# Patient Record
Sex: MALE | Race: WHITE | NOT HISPANIC OR LATINO | Employment: FULL TIME | URBAN - METROPOLITAN AREA
[De-identification: names, ages, dates, MRNs, and addresses within clinical notes are randomized per-mention and may not be internally consistent; named-entity substitution may affect disease eponyms.]

---

## 2017-06-09 ENCOUNTER — OFFICE VISIT (OUTPATIENT)
Dept: PEDIATRICS | Facility: CLINIC | Age: 27
End: 2017-06-09
Payer: COMMERCIAL

## 2017-06-09 VITALS
OXYGEN SATURATION: 97 % | WEIGHT: 206.9 LBS | TEMPERATURE: 97.5 F | BODY MASS INDEX: 25.73 KG/M2 | HEIGHT: 75 IN | HEART RATE: 99 BPM | SYSTOLIC BLOOD PRESSURE: 130 MMHG | DIASTOLIC BLOOD PRESSURE: 86 MMHG

## 2017-06-09 DIAGNOSIS — I10 ESSENTIAL HYPERTENSION WITH GOAL BLOOD PRESSURE LESS THAN 140/90: ICD-10-CM

## 2017-06-09 DIAGNOSIS — F40.10 SOCIAL PHOBIA: ICD-10-CM

## 2017-06-09 DIAGNOSIS — Z00.00 ROUTINE GENERAL MEDICAL EXAMINATION AT A HEALTH CARE FACILITY: Primary | ICD-10-CM

## 2017-06-09 LAB
ANION GAP SERPL CALCULATED.3IONS-SCNC: 10 MMOL/L (ref 3–14)
BUN SERPL-MCNC: 11 MG/DL (ref 7–30)
CALCIUM SERPL-MCNC: 9.3 MG/DL (ref 8.5–10.1)
CHLORIDE SERPL-SCNC: 103 MMOL/L (ref 94–109)
CHOLEST SERPL-MCNC: 188 MG/DL
CO2 SERPL-SCNC: 28 MMOL/L (ref 20–32)
CREAT SERPL-MCNC: 0.9 MG/DL (ref 0.66–1.25)
GFR SERPL CREATININE-BSD FRML MDRD: NORMAL ML/MIN/1.7M2
GLUCOSE SERPL-MCNC: 98 MG/DL (ref 70–99)
HDLC SERPL-MCNC: 65 MG/DL
LDLC SERPL CALC-MCNC: 113 MG/DL
NONHDLC SERPL-MCNC: 123 MG/DL
POTASSIUM SERPL-SCNC: 3.5 MMOL/L (ref 3.4–5.3)
SODIUM SERPL-SCNC: 141 MMOL/L (ref 133–144)
TRIGL SERPL-MCNC: 50 MG/DL

## 2017-06-09 PROCEDURE — 36415 COLL VENOUS BLD VENIPUNCTURE: CPT | Performed by: INTERNAL MEDICINE

## 2017-06-09 PROCEDURE — 80061 LIPID PANEL: CPT | Performed by: INTERNAL MEDICINE

## 2017-06-09 PROCEDURE — 99395 PREV VISIT EST AGE 18-39: CPT | Performed by: INTERNAL MEDICINE

## 2017-06-09 PROCEDURE — 80048 BASIC METABOLIC PNL TOTAL CA: CPT | Performed by: INTERNAL MEDICINE

## 2017-06-09 RX ORDER — HYDROCHLOROTHIAZIDE 25 MG/1
25 TABLET ORAL EVERY MORNING
Qty: 90 TABLET | Refills: 3 | Status: SHIPPED | OUTPATIENT
Start: 2017-06-09 | End: 2018-03-16

## 2017-06-09 RX ORDER — PAROXETINE 20 MG/1
20 TABLET, FILM COATED ORAL EVERY MORNING
Qty: 90 TABLET | Refills: 3 | Status: SHIPPED | OUTPATIENT
Start: 2017-06-09 | End: 2018-07-18 | Stop reason: SINTOL

## 2017-06-09 NOTE — PROGRESS NOTES
SUBJECTIVE:     CC: Akira Fritz is an 27 year old male who presents for preventative health visit.     Physical   Annual:     Getting at least 3 servings of Calcium per day::  Yes    Bi-annual eye exam::  Yes    Dental care twice a year::  Yes    Sleep apnea or symptoms of sleep apnea::  None    Frequency of exercise::  2-3 days/week    Duration of exercise::  30-45 minutes    Taking medications regularly::  Yes    Medication side effects::  Other    HTN. Taking HCTZ. BP is controlled. No cardiac sx such as CP, palpitations, PND, orthopnea, LEMON or peripheral edema.    Social phobia. Helped by paroxetine.     Today's PHQ-2 Score:   PHQ-2 ( 1999 Pfizer) 6/8/2017   Q1: Little interest or pleasure in doing things Not at all   Q2: Feeling down, depressed or hopeless Not at all   PHQ-2 Score 0       Abuse: Current or Past(Physical, Sexual or Emotional)- No  Do you feel safe in your environment - Yes    Social History   Substance Use Topics     Smoking status: Never Smoker     Smokeless tobacco: Never Used      Comment: non smoking home     Alcohol use 0.0 oz/week     0 Standard drinks or equivalent per week      Comment: social     The patient does not drink >3 drinks per day nor >7 drinks per week.    Last PSA: No results found for: PSA    Recent Labs   Lab Test  01/20/15   0734  01/06/12   1324   CHOL  201*  236*   HDL  87  62   LDL  91   --    TRIG  115   --    CHOLHDLRATIO  2.3   --        Reviewed orders with patient. Reviewed health maintenance and updated orders accordingly - Yes    Reviewed and updated as needed this visit by clinical staff  Tobacco  Allergies  Meds  Med Hx  Surg Hx  Fam Hx  Soc Hx        Reviewed and updated as needed this visit by Provider  Tobacco  Allergies  Meds  Problems  Med Hx  Surg Hx  Fam Hx  Soc Hx             ROS:  C: NEGATIVE for fever, chills, change in weight  I: NEGATIVE for worrisome rashes, moles or lesions  E: NEGATIVE for vision changes or  "irritation  ENT: NEGATIVE for ear, mouth and throat problems  R: NEGATIVE for significant cough or SOB  CV: NEGATIVE for chest pain, palpitations or peripheral edema  GI: NEGATIVE for nausea, abdominal pain, heartburn, or change in bowel habits   male: negative for dysuria, hematuria, decreased urinary stream, erectile dysfunction, urethral discharge  M: NEGATIVE for significant arthralgias or myalgia  N: NEGATIVE for weakness, dizziness or paresthesias  P: NEGATIVE for changes in mood or affect    Problem list, Medication list, Allergies, and Medical/Social/Surgical histories reviewed in [facility] and updated as appropriate.  Labs reviewed in EPIC  OBJECTIVE:     /86 (Cuff Size: Adult Regular)  Pulse 99  Temp 97.5  F (36.4  C) (Oral)  Ht 6' 3\" (1.905 m)  Wt 206 lb 14.4 oz (93.8 kg)  SpO2 97%  BMI 25.86 kg/m2  EXAM:  GENERAL: healthy, alert and no distress  EYES: Eyes grossly normal to inspection, PERRL and conjunctivae and sclerae normal  HENT: ear canals and TM's normal, nose and mouth without ulcers or lesions  NECK: no adenopathy, no asymmetry, masses, or scars and thyroid normal to palpation  RESP: lungs clear to auscultation - no rales, rhonchi or wheezes  CV: regular rate and rhythm, normal S1 S2, no S3 or S4, no murmur, click or rub, no peripheral edema and peripheral pulses strong  ABDOMEN: soft, nontender, no hepatosplenomegaly, no masses and bowel sounds normal  : normal male genitalia without lesions or urethral discharge, no hernia  MS: no gross musculoskeletal defects noted, no edema  SKIN: no suspicious lesions or rashes  NEURO: Normal strength and tone, mentation intact and speech normal  PSYCH: mentation appears normal, affect normal/bright    ASSESSMENT/PLAN:         ICD-10-CM    1. Routine general medical examination at a health care facility Z00.00 Basic metabolic panel     Lipid Profile (Chol, Trig, HDL, LDL calc)   2. Essential hypertension with goal blood pressure less than 140/90 " "I10 hydrochlorothiazide (HYDRODIURIL) 25 MG tablet     Basic metabolic panel   3. Social phobia F40.10 PARoxetine (PAXIL) 20 MG tablet       COUNSELING:   Reviewed preventive health counseling, as reflected in patient instructions         reports that he has never smoked. He has never used smokeless tobacco.    Estimated body mass index is 25.86 kg/(m^2) as calculated from the following:    Height as of this encounter: 6' 3\" (1.905 m).    Weight as of this encounter: 206 lb 14.4 oz (93.8 kg).   Weight management plan: Discussed healthy diet and exercise guidelines and patient will follow up in 12 months in clinic to re-evaluate.      Gildardo Engel MD  [facility]  "

## 2017-06-09 NOTE — MR AVS SNAPSHOT
After Visit Summary   6/9/2017    Akira Fritz    MRN: [id number]           Patient Information     Date Of Birth          1990        Visit Information        Provider Department      6/9/2017 9:20 AM Gildardo Engel MD [facility]        Today's Diagnoses     Routine general medical examination at a health care facility    -  1    Essential hypertension with goal blood pressure less than 140/90        Social phobia          Care Instructions      Preventive Health Recommendations    Yearly exam:             See your health care provider every year in order to  o   Review health changes.   o   Discuss preventive care.    o   Review your medicines.    Shots: Get a flu shot each year. Get a tetanus shot every 10 years.     Nutrition:    Eat at least 5 servings of fruits and vegetables daily.     Eat whole-grain bread, whole-wheat pasta and brown rice instead of white grains and rice.     Get adequate Calcium and Vitamin D.     Lifestyle    Exercise for at least 150 minutes a week (30 minutes a day, 5 days a week). This will help you control your weight and prevent disease.     Limit alcohol to one drink per day.     No smoking.     Wear sunscreen to prevent skin cancer.      See your dentist every six months for an exam and cleaning.             Follow-ups after your visit        Who to contact     If you have questions or need follow up information about today's clinic visit or your schedule please contact [facility] directly at [phone].  Normal or non-critical lab and imaging results will be communicated to you by MyChart, letter or phone within 4 business days after the clinic has received the results. If you do not hear from us within 7 days, please contact the clinic through MyChart or phone. If you have a critical or abnormal lab result, we will notify you by phone as soon as possible.  Submit refill requests through [employer] or call your pharmacy and they  "will forward the refill request to us. Please allow 3 business days for your refill to be completed.          Additional Information About Your Visit        [employer] Information     [employer] gives you secure access to your electronic health record. If you see a primary care provider, you can also send messages to your care team and make appointments. If you have questions, please call your primary care clinic.  If you do not have a primary care provider, please call [phone] and they will assist you.        Care EveryWhere ID     This is your Care EveryWhere ID. This could be used by other organizations to access your [place] medical records  [id number]        Your Vitals Were     Pulse Temperature Height Pulse Oximetry BMI (Body Mass Index)       99 97.5  F (36.4  C) (Oral) 6' 3\" (1.905 m) 97% 25.86 kg/m2        Blood Pressure from Last 3 Encounters:   06/09/17 130/86   06/06/16 120/80   01/20/15 (!) 123/92    Weight from Last 3 Encounters:   06/09/17 206 lb 14.4 oz (93.8 kg)   06/06/16 197 lb (89.4 kg)   01/20/15 178 lb (80.7 kg)              We Performed the Following     Basic metabolic panel     Lipid Profile (Chol, Trig, HDL, LDL calc)          Where to get your medicines      These medications were sent to [facility] [address] [address]     Phone:  [phone]     hydrochlorothiazide 25 MG tablet    PARoxetine 20 MG tablet          Primary Care Provider Office Phone # Fax #    Gildardo Engel [phone]       [address] [place] [address]        Thank you!     Thank you for choosing [facility]  for your care. Our goal is always to provide you with excellent care. Hearing back from our patients is one way we can continue to improve our services. Please take a few minutes to complete the written survey that you may receive in the mail after your visit with us. " Thank you!             Your Updated Medication List - Protect others around you: Learn how to safely use, store and throw away your medicines at www.disposemymeds.org.          This list is accurate as of: 6/9/17  9:44 AM.  Always use your most recent med list.                   Brand Name Dispense Instructions for use    hydrochlorothiazide 25 MG tablet    HYDRODIURIL    90 tablet    Take 1 tablet (25 mg) by mouth every morning       PARoxetine 20 MG tablet    PAXIL    90 tablet    Take 1 tablet (20 mg) by mouth every morning

## 2017-06-09 NOTE — PATIENT INSTRUCTIONS
Preventive Health Recommendations    Yearly exam:             See your health care provider every year in order to  o   Review health changes.   o   Discuss preventive care.    o   Review your medicines.    Shots: Get a flu shot each year. Get a tetanus shot every 10 years.     Nutrition:    Eat at least 5 servings of fruits and vegetables daily.     Eat whole-grain bread, whole-wheat pasta and brown rice instead of white grains and rice.     Get adequate Calcium and Vitamin D.     Lifestyle    Exercise for at least 150 minutes a week (30 minutes a day, 5 days a week). This will help you control your weight and prevent disease.     Limit alcohol to one drink per day.     No smoking.     Wear sunscreen to prevent skin cancer.      See your dentist every six months for an exam and cleaning.

## 2017-06-09 NOTE — NURSING NOTE
"Chief Complaint   Patient presents with     Physical       Initial /86 (Cuff Size: Adult Regular)  Pulse 99  Temp 97.5  F (36.4  C) (Oral)  Ht 6' 3\" (1.905 m)  Wt 206 lb 14.4 oz (93.8 kg)  SpO2 97%  BMI 25.86 kg/m2 Estimated body mass index is 25.86 kg/(m^2) as calculated from the following:    Height as of this encounter: 6' 3\" (1.905 m).    Weight as of this encounter: 206 lb 14.4 oz (93.8 kg).  Medication Reconciliation: carlos alberto Paula   "

## 2017-09-22 ENCOUNTER — MYC MEDICAL ADVICE (OUTPATIENT)
Dept: PEDIATRICS | Facility: CLINIC | Age: 27
End: 2017-09-22

## 2017-09-22 NOTE — TELEPHONE ENCOUNTER
Advised pt to send a e-visit request to discuss about new dx of GERD. LOV was on 06/09/17.     Katherin, RN  Triage Nurse

## 2017-10-01 ENCOUNTER — MYC MEDICAL ADVICE (OUTPATIENT)
Dept: PEDIATRICS | Facility: CLINIC | Age: 27
End: 2017-10-01

## 2017-10-01 DIAGNOSIS — I10 ESSENTIAL HYPERTENSION WITH GOAL BLOOD PRESSURE LESS THAN 140/90: ICD-10-CM

## 2017-10-02 RX ORDER — HYDROCHLOROTHIAZIDE 25 MG/1
25 TABLET ORAL
Qty: 30 TABLET | Refills: 0 | Status: SHIPPED | OUTPATIENT
Start: 2017-10-02 | End: 2019-06-15

## 2017-10-02 NOTE — TELEPHONE ENCOUNTER
Sent one month supply of hctz to [facility] in [place] to last till his mail order shipment to arrive.    Katherin, RN  Triage Nurse

## 2018-03-15 ENCOUNTER — MYC MEDICAL ADVICE (OUTPATIENT)
Dept: PEDIATRICS | Facility: CLINIC | Age: 28
End: 2018-03-15

## 2018-03-15 DIAGNOSIS — I10 ESSENTIAL HYPERTENSION WITH GOAL BLOOD PRESSURE LESS THAN 140/90: ICD-10-CM

## 2018-03-16 RX ORDER — HYDROCHLOROTHIAZIDE 25 MG/1
25 TABLET ORAL EVERY MORNING
Qty: 90 TABLET | Refills: 0 | Status: SHIPPED | OUTPATIENT
Start: 2018-03-16 | End: 2018-07-18

## 2018-03-16 NOTE — TELEPHONE ENCOUNTER
Pt would like us to transfer his HCTZ to [facility]. Sent 3 months supply to requested pharmacy.     Pt will be due for px & fasting labs after 06/09/18. Advised pt to schedule an appointment.    hydrochlorothiazide (HYDRODIURIL) 25 MG tablet 90 tablet 3 6/9/2017  No   Sig: Take 1 tablet (25 mg) by mouth every morning     Katherin RN  Triage Nurse

## 2018-07-18 ENCOUNTER — OFFICE VISIT (OUTPATIENT)
Dept: PEDIATRICS | Facility: CLINIC | Age: 28
End: 2018-07-18
Payer: COMMERCIAL

## 2018-07-18 VITALS
SYSTOLIC BLOOD PRESSURE: 132 MMHG | OXYGEN SATURATION: 98 % | DIASTOLIC BLOOD PRESSURE: 84 MMHG | TEMPERATURE: 98.7 F | RESPIRATION RATE: 14 BRPM | HEIGHT: 76 IN | WEIGHT: 216 LBS | BODY MASS INDEX: 26.3 KG/M2 | HEART RATE: 112 BPM

## 2018-07-18 DIAGNOSIS — Z00.00 ROUTINE GENERAL MEDICAL EXAMINATION AT A HEALTH CARE FACILITY: Primary | ICD-10-CM

## 2018-07-18 DIAGNOSIS — F40.10 SOCIAL PHOBIA: ICD-10-CM

## 2018-07-18 DIAGNOSIS — I10 ESSENTIAL HYPERTENSION WITH GOAL BLOOD PRESSURE LESS THAN 140/90: ICD-10-CM

## 2018-07-18 LAB
ANION GAP SERPL CALCULATED.3IONS-SCNC: 5 MMOL/L (ref 3–14)
BUN SERPL-MCNC: 16 MG/DL (ref 7–30)
CALCIUM SERPL-MCNC: 8.9 MG/DL (ref 8.5–10.1)
CHLORIDE SERPL-SCNC: 102 MMOL/L (ref 94–109)
CO2 SERPL-SCNC: 32 MMOL/L (ref 20–32)
CREAT SERPL-MCNC: 0.95 MG/DL (ref 0.66–1.25)
GFR SERPL CREATININE-BSD FRML MDRD: >90 ML/MIN/1.7M2
GLUCOSE SERPL-MCNC: 100 MG/DL (ref 70–99)
POTASSIUM SERPL-SCNC: 3.6 MMOL/L (ref 3.4–5.3)
SODIUM SERPL-SCNC: 139 MMOL/L (ref 133–144)

## 2018-07-18 PROCEDURE — 99395 PREV VISIT EST AGE 18-39: CPT | Performed by: INTERNAL MEDICINE

## 2018-07-18 PROCEDURE — 36415 COLL VENOUS BLD VENIPUNCTURE: CPT | Performed by: INTERNAL MEDICINE

## 2018-07-18 PROCEDURE — 80048 BASIC METABOLIC PNL TOTAL CA: CPT | Performed by: INTERNAL MEDICINE

## 2018-07-18 RX ORDER — HYDROCHLOROTHIAZIDE 25 MG/1
25 TABLET ORAL EVERY MORNING
Qty: 90 TABLET | Refills: 3 | Status: SHIPPED | OUTPATIENT
Start: 2018-07-18 | End: 2019-06-11

## 2018-07-18 RX ORDER — ESCITALOPRAM OXALATE 10 MG/1
10 TABLET ORAL DAILY
Qty: 90 TABLET | Refills: 3 | Status: SHIPPED | OUTPATIENT
Start: 2018-07-18 | End: 2019-06-11

## 2018-07-18 RX ORDER — PAROXETINE 20 MG/1
20 TABLET, FILM COATED ORAL EVERY MORNING
Qty: 90 TABLET | Refills: 3 | Status: CANCELLED | OUTPATIENT
Start: 2018-07-18

## 2018-07-18 ASSESSMENT — ENCOUNTER SYMPTOMS
MUSCULOSKELETAL NEGATIVE: 1
NERVOUS/ANXIOUS: 1
ENDOCRINE NEGATIVE: 1
RESPIRATORY NEGATIVE: 1
GASTROINTESTINAL NEGATIVE: 1
EYES NEGATIVE: 1
CARDIOVASCULAR NEGATIVE: 1
CONSTITUTIONAL NEGATIVE: 1
NEUROLOGICAL NEGATIVE: 1

## 2018-07-18 NOTE — PROGRESS NOTES
SUBJECTIVE:   CC: Akira Fritz is an 28 year old male who presents for preventative health visit.     Physical   Annual:     Getting at least 3 servings of Calcium per day:  Yes    Bi-annual eye exam:  Yes    Dental care twice a year:  Yes    Sleep apnea or symptoms of sleep apnea:  None    Diet:  Regular (no restrictions)    Frequency of exercise:  2-3 days/week    Duration of exercise:  30-45 minutes    Taking medications regularly:  Yes    Medication side effects:  Other    Additional concerns today:  YES (Acid reflux concerns; side effect from paxil)    HTN. Does not typically check BP outside of clinic. No cardiac sx such as CP, palpitations, PND, orthopnea, LEMON or peripheral edema.     At a recent dental visit, concerns about erosions on the back teeth. Question possible FITO. No sx of burning sensation or water brash. No difficulty swallowing. Discussed options - H2 blocker if med wanted to be tried.     Taking paroxetine for anxiety. Having possible SE of decreased libido. Would like to change medication. Discussed Lexapro.     Today's PHQ-2 Score:   PHQ-2 ( 1999 Pfizer) 7/17/2018   Q1: Little interest or pleasure in doing things 0   Q2: Feeling down, depressed or hopeless 0   PHQ-2 Score 0   Q1: Little interest or pleasure in doing things Not at all   Q2: Feeling down, depressed or hopeless Not at all   PHQ-2 Score 0       Abuse: Current or Past(Physical, Sexual or Emotional)- No  Do you feel safe in your environment - Yes    Social History   Substance Use Topics     Smoking status: Never Smoker     Smokeless tobacco: Never Used      Comment: non smoking home     Alcohol use 0.0 oz/week     0 Standard drinks or equivalent per week      Comment: social     Alcohol Use 7/17/2018   If you drink alcohol do you typically have greater than 3 drinks per day OR greater than 7 drinks per week? No   No flowsheet data found.    Reviewed orders with patient. Reviewed health maintenance and updated orders  "accordingly - Yes  Labs reviewed in EPIC    Reviewed and updated as needed this visit by clinical staff  Tobacco  Allergies  Meds  Problems  Med Hx  Surg Hx  Fam Hx  Soc Hx          Reviewed and updated as needed this visit by Provider  Tobacco  Allergies  Meds  Problems  Med Hx  Surg Hx  Fam Hx  Soc Hx             Review of Systems   Constitutional: Negative.    HENT: Negative.    Eyes: Negative.    Respiratory: Negative.    Cardiovascular: Negative.    Gastrointestinal: Negative.    Endocrine: Negative.    Genitourinary: Negative.    Musculoskeletal: Negative.    Skin: Negative.    Neurological: Negative.    Psychiatric/Behavioral: The patient is nervous/anxious.        OBJECTIVE:   /84  Pulse 112  Temp 98.7  F (37.1  C) (Tympanic)  Resp 14  Ht 6' 4\" (1.93 m)  Wt 216 lb (98 kg)  SpO2 98%  BMI 26.29 kg/m2    Physical Exam  GENERAL: healthy, alert and no distress  EYES: Eyes grossly normal to inspection, PERRL and conjunctivae and sclerae normal  HENT: ear canals and TM's normal, nose and mouth without ulcers or lesions  NECK: no adenopathy, no asymmetry, masses, or scars and thyroid normal to palpation  RESP: lungs clear to auscultation - no rales, rhonchi or wheezes  CV: regular rate and rhythm, normal S1 S2, no S3 or S4, no murmur, click or rub, no peripheral edema and peripheral pulses strong  ABDOMEN: soft, nontender, no hepatosplenomegaly, no masses and bowel sounds normal  : deferred per pt  MS: no gross musculoskeletal defects noted, no edema  SKIN: no suspicious lesions or rashes  NEURO: Normal strength and tone, mentation intact and speech normal  PSYCH: mentation appears normal, affect normal/bright      ASSESSMENT/PLAN:       ICD-10-CM    1. Routine general medical examination at a health care facility Z00.00 Basic metabolic panel   2. Essential hypertension with goal blood pressure less than 140/90 I10 hydrochlorothiazide (HYDRODIURIL) 25 MG tablet     Basic metabolic panel " "  3. Social phobia F40.10 escitalopram (LEXAPRO) 10 MG tablet     If you want to try medications for possible acid reflux, I recommend:   Zantac (ranitidine) 150 mg twice per day or   Pepcid (famotidine) 20 mg twice per day    Change from Paxil to Lexapro (escitalopram) 10 mg once per day   If needed, contact me to increase the dose to 20 mg once per day    COUNSELING:   Reviewed preventive health counseling, as reflected in patient instructions    BP Readings from Last 1 Encounters:   07/18/18 132/84     Estimated body mass index is 26.29 kg/(m^2) as calculated from the following:    Height as of this encounter: 6' 4\" (1.93 m).    Weight as of this encounter: 216 lb (98 kg).      Weight management plan: Discussed healthy diet and exercise guidelines and patient will follow up in 12 months in clinic to re-evaluate.     reports that he has never smoked. He has never used smokeless tobacco.      Gildardo Engel MD  [facility]  "

## 2018-07-18 NOTE — MR AVS SNAPSHOT
After Visit Summary   7/18/2018    Akira Fritz    MRN: [id number]           Patient Information     Date Of Birth          1990        Visit Information        Provider Department      7/18/2018 7:20 AM Gildardo Engel MD [facility]        Today's Diagnoses     Routine general medical examination at a health care facility    -  1    Essential hypertension with goal blood pressure less than 140/90        Social phobia          Care Instructions    If you want to try medications for possible acid reflux, I recommend:   Zantac (ranitidine) 150 mg twice per day or   Pepcid (famotidine) 20 mg twice per day    Change from Paxil to Lexapro (escitalopram) 10 mg once per day   If needed, contact me to increase the dose to 20 mg once per day      Preventive Health Recommendations    Yearly exam:             See your health care provider every year in order to  o   Review health changes.   o   Discuss preventive care.      o   Review your medicines.  Shots: Get a flu shot each year. Get a tetanus shot every 10 years.     Nutrition:    Eat at least 5 servings of fruits and vegetables daily.     Eat whole-grain bread, whole-wheat pasta and brown rice instead of white grains and rice.     Get adequate Calcium and Vitamin D.     Lifestyle    Exercise for at least 150 minutes a week (30 minutes a day, 5 days a week). This will help you control your weight and prevent disease.     Limit alcohol to one drink per day.     No smoking.     Wear sunscreen to prevent skin cancer.     See your dentist every six months for an exam and cleaning.             Follow-ups after your visit        Who to contact     If you have questions or need follow up information about today's clinic visit or your schedule please contact [facility] directly at [phone].  Normal or non-critical lab and imaging results will be communicated to you by MyChart, letter or phone within 4 business days after the  "clinic has received the results. If you do not hear from us within 7 days, please contact the clinic through [employer] or phone. If you have a critical or abnormal lab result, we will notify you by phone as soon as possible.  Submit refill requests through [employer] or call your pharmacy and they will forward the refill request to us. Please allow 3 business days for your refill to be completed.          Additional Information About Your Visit        [employer] Information     [employer] gives you secure access to your electronic health record. If you see a primary care provider, you can also send messages to your care team and make appointments. If you have questions, please call your primary care clinic.  If you do not have a primary care provider, please call [phone] and they will assist you.        Care EveryWhere ID     This is your Care EveryWhere ID. This could be used by other organizations to access your [place] medical records  [id number]        Your Vitals Were     Pulse Temperature Respirations Height Pulse Oximetry BMI (Body Mass Index)    112 98.7  F (37.1  C) (Tympanic) 14 6' 4\" (1.93 m) 98% 26.29 kg/m2       Blood Pressure from Last 3 Encounters:   07/18/18 132/84   06/09/17 130/86   06/06/16 120/80    Weight from Last 3 Encounters:   07/18/18 216 lb (98 kg)   06/09/17 206 lb 14.4 oz (93.8 kg)   06/06/16 197 lb (89.4 kg)              We Performed the Following     Basic metabolic panel          Today's Medication Changes          These changes are accurate as of 7/18/18  7:42 AM.  If you have any questions, ask your nurse or doctor.               Start taking these medicines.        Dose/Directions    escitalopram 10 MG tablet   Commonly known as:  LEXAPRO   Used for:  Social phobia   Started by:  Gildardo Engel MD        Dose:  10 mg   Take 1 tablet (10 mg) by mouth daily   Quantity:  90 tablet   Refills:  3         Stop taking these medicines if you haven't already. Please contact your care team if " you have questions.     PARoxetine 20 MG tablet   Commonly known as:  PAXIL   Stopped by:  Gildardo Engel MD                Where to get your medicines      These medications were sent to [facility] [id number] [facility] - [place], [place] - [address]  [address], [facility] [address]     Phone:  [phone]     escitalopram 10 MG tablet    hydrochlorothiazide 25 MG tablet                Primary Care Provider Office Phone # Fax #    Gildardo Engel [phone]       [facility] [place] [address]        Equal Access to Services     [facility]: Hadii aad ku hadasho Soomaali, waaxda luqadaha, qaybta kaalmada adeegyada, waxay carmen haymatt dunaway . So [facility] [phone].    ATENCIÓN: Si habla español, tiene a crook disposición servicios gratuitos de asistencia lingüística. [facility] [phone].    We comply with applicable federal civil rights laws and [place] laws. We do not discriminate on the basis of race, color, national origin, age, disability, sex, sexual orientation, or gender identity.            Thank you!     Thank you for choosing [facility]  for your care. Our goal is always to provide you with excellent care. Hearing back from our patients is one way we can continue to improve our services. Please take a few minutes to complete the written survey that you may receive in the mail after your visit with us. Thank you!             Your Updated Medication List - Protect others around you: Learn how to safely use, store and throw away your medicines at www.disposemymeds.org.          This list is accurate as of 7/18/18  7:42 AM.  Always use your most recent med list.                   Brand Name Dispense Instructions for use Diagnosis    escitalopram 10 MG tablet    LEXAPRO    90 tablet    Take 1 tablet (10 mg) by mouth daily    Social phobia       hydrochlorothiazide 25 MG tablet    HYDRODIURIL    90 tablet    Take 1 tablet (25 mg) by mouth  every morning    Essential hypertension with goal blood pressure less than 140/90

## 2018-07-18 NOTE — PATIENT INSTRUCTIONS
If you want to try medications for possible acid reflux, I recommend:   Zantac (ranitidine) 150 mg twice per day or   Pepcid (famotidine) 20 mg twice per day    Change from Paxil to Lexapro (escitalopram) 10 mg once per day   If needed, contact me to increase the dose to 20 mg once per day      Preventive Health Recommendations    Yearly exam:             See your health care provider every year in order to  o   Review health changes.   o   Discuss preventive care.      o   Review your medicines.  Shots: Get a flu shot each year. Get a tetanus shot every 10 years.     Nutrition:    Eat at least 5 servings of fruits and vegetables daily.     Eat whole-grain bread, whole-wheat pasta and brown rice instead of white grains and rice.     Get adequate Calcium and Vitamin D.     Lifestyle    Exercise for at least 150 minutes a week (30 minutes a day, 5 days a week). This will help you control your weight and prevent disease.     Limit alcohol to one drink per day.     No smoking.     Wear sunscreen to prevent skin cancer.     See your dentist every six months for an exam and cleaning.

## 2019-06-11 ENCOUNTER — MYC REFILL (OUTPATIENT)
Dept: PEDIATRICS | Facility: CLINIC | Age: 29
End: 2019-06-11

## 2019-06-11 DIAGNOSIS — I10 ESSENTIAL HYPERTENSION WITH GOAL BLOOD PRESSURE LESS THAN 140/90: ICD-10-CM

## 2019-06-11 DIAGNOSIS — F40.10 SOCIAL PHOBIA: ICD-10-CM

## 2019-06-12 ENCOUNTER — MYC MEDICAL ADVICE (OUTPATIENT)
Dept: PEDIATRICS | Facility: CLINIC | Age: 29
End: 2019-06-12

## 2019-06-12 RX ORDER — HYDROCHLOROTHIAZIDE 25 MG/1
25 TABLET ORAL EVERY MORNING
Qty: 30 TABLET | Refills: 1 | Status: SHIPPED | OUTPATIENT
Start: 2019-06-12 | End: 2019-08-14

## 2019-06-12 RX ORDER — ESCITALOPRAM OXALATE 10 MG/1
10 TABLET ORAL DAILY
Qty: 30 TABLET | Refills: 1 | Status: SHIPPED | OUTPATIENT
Start: 2019-06-12 | End: 2019-08-14

## 2019-06-12 NOTE — TELEPHONE ENCOUNTER
"Last Written Prescription Date:  7/18/2018  Last Fill Quantity: 90,  # refills: 3   Last office visit: 7/18/2018 with prescribing provider:  Yes   Future Office Visit:   Next 5 appointments (look out 90 days)    Aug 09, 2019 10:25 AM CDT  Adult physical with Gildardo Engel MD, EA EXAM ROOM 08  [facility] [address] [place] [address]  [address] [place] [id number] [phone]             Requested Prescriptions   Pending Prescriptions Disp Refills     hydrochlorothiazide (HYDRODIURIL) 25 MG tablet 90 tablet 3     Sig: Take 1 tablet (25 mg) by mouth every morning       Diuretics (Including Combos) Protocol Passed - 6/11/2019  3:26 PM        Passed - Blood pressure under 140/90 in past 12 months     BP Readings from Last 3 Encounters:   07/18/18 132/84   06/09/17 130/86   06/06/16 120/80                 Passed - Recent (12 mo) or future (30 days) visit within the authorizing provider's specialty     Patient had office visit in the last 12 months or has a visit in the next 30 days with authorizing provider or within the authorizing provider's specialty.  See \"Patient Info\" tab in inbasket, or \"Choose Columns\" in Meds & Orders section of the refill encounter.              Passed - Medication is active on med list        Passed - Patient is age 18 or older        Passed - Normal serum creatinine on file in past 12 months     Recent Labs   Lab Test 07/18/18  0742   CR 0.95              Passed - Normal serum potassium on file in past 12 months     Recent Labs   Lab Test 07/18/18  0742   POTASSIUM 3.6                    Passed - Normal serum sodium on file in past 12 months     Recent Labs   Lab Test 07/18/18  0742                 escitalopram (LEXAPRO) 10 MG tablet 90 tablet 3     Sig: Take 1 tablet (10 mg) by mouth daily       SSRIs Protocol Passed - 6/11/2019  3:26 PM        Passed - Recent (12 mo) or future (30 days) visit within the authorizing provider's specialty     " "Patient had office visit in the last 12 months or has a visit in the next 30 days with authorizing provider or within the authorizing provider's specialty.  See \"Patient Info\" tab in inbasket, or \"Choose Columns\" in Meds & Orders section of the refill encounter.              Passed - Medication is active on med list        Passed - Patient is age 18 or older            Prescription approved per [facility] Refill Protocol.  30 day supply given with 1 refill.  Appointment scheduled.  Patient notified by phone.      KANWAL Palacios, RN, N  [facility]) [phone]      "

## 2019-06-28 DIAGNOSIS — I10 ESSENTIAL HYPERTENSION WITH GOAL BLOOD PRESSURE LESS THAN 140/90: ICD-10-CM

## 2019-06-28 DIAGNOSIS — F40.10 SOCIAL PHOBIA: ICD-10-CM

## 2019-06-28 RX ORDER — ESCITALOPRAM OXALATE 10 MG/1
TABLET ORAL
Qty: 90 TABLET | Refills: 0 | Status: SHIPPED | OUTPATIENT
Start: 2019-06-28 | End: 2020-01-08

## 2019-06-28 RX ORDER — HYDROCHLOROTHIAZIDE 25 MG/1
25 TABLET ORAL EVERY MORNING
Qty: 90 TABLET | Refills: 0 | Status: SHIPPED | OUTPATIENT
Start: 2019-06-28 | End: 2019-11-06

## 2019-06-28 NOTE — TELEPHONE ENCOUNTER
Not ciara for a refill. Patient has 1 refill left.     escitalopram (LEXAPRO) 10 MG tablet was filled on 6/12/2019, qty 30 with 1 refills.     hydrochlorothiazide (HYDRODIURIL) 25 MG tablet was filled on 6/12/2019, qty 30 with 1 refills.

## 2019-08-14 ENCOUNTER — OFFICE VISIT (OUTPATIENT)
Dept: PEDIATRICS | Facility: CLINIC | Age: 29
End: 2019-08-14
Payer: COMMERCIAL

## 2019-08-14 VITALS
HEIGHT: 76 IN | HEART RATE: 132 BPM | RESPIRATION RATE: 16 BRPM | OXYGEN SATURATION: 98 % | WEIGHT: 216 LBS | SYSTOLIC BLOOD PRESSURE: 146 MMHG | DIASTOLIC BLOOD PRESSURE: 104 MMHG | BODY MASS INDEX: 26.3 KG/M2 | TEMPERATURE: 98 F

## 2019-08-14 DIAGNOSIS — I10 ESSENTIAL HYPERTENSION: ICD-10-CM

## 2019-08-14 DIAGNOSIS — Z00.00 ROUTINE GENERAL MEDICAL EXAMINATION AT A HEALTH CARE FACILITY: Primary | ICD-10-CM

## 2019-08-14 DIAGNOSIS — F40.10 SOCIAL PHOBIA: ICD-10-CM

## 2019-08-14 DIAGNOSIS — M65.4 RADIAL STYLOID TENOSYNOVITIS (DE QUERVAIN): ICD-10-CM

## 2019-08-14 PROCEDURE — 80061 LIPID PANEL: CPT | Performed by: INTERNAL MEDICINE

## 2019-08-14 PROCEDURE — 80048 BASIC METABOLIC PNL TOTAL CA: CPT | Performed by: INTERNAL MEDICINE

## 2019-08-14 PROCEDURE — 87389 HIV-1 AG W/HIV-1&-2 AB AG IA: CPT | Performed by: INTERNAL MEDICINE

## 2019-08-14 PROCEDURE — 99395 PREV VISIT EST AGE 18-39: CPT | Performed by: INTERNAL MEDICINE

## 2019-08-14 PROCEDURE — 99213 OFFICE O/P EST LOW 20 MIN: CPT | Mod: 25 | Performed by: INTERNAL MEDICINE

## 2019-08-14 PROCEDURE — 36415 COLL VENOUS BLD VENIPUNCTURE: CPT | Performed by: INTERNAL MEDICINE

## 2019-08-14 RX ORDER — ESCITALOPRAM OXALATE 20 MG/1
20 TABLET ORAL DAILY
Qty: 90 TABLET | Refills: 3 | Status: SHIPPED | OUTPATIENT
Start: 2019-08-14 | End: 2020-01-08

## 2019-08-14 RX ORDER — LISINOPRIL/HYDROCHLOROTHIAZIDE 10-12.5 MG
1 TABLET ORAL DAILY
Qty: 30 TABLET | Refills: 1 | Status: SHIPPED | OUTPATIENT
Start: 2019-08-14 | End: 2019-09-06

## 2019-08-14 ASSESSMENT — ENCOUNTER SYMPTOMS
DIARRHEA: 0
JOINT SWELLING: 0
CHILLS: 0
FEVER: 0
HEARTBURN: 0
SORE THROAT: 0
PARESTHESIAS: 0
HEMATURIA: 0
DYSURIA: 0
SHORTNESS OF BREATH: 0
DIZZINESS: 0
ABDOMINAL PAIN: 0
EYE PAIN: 0
PALPITATIONS: 0
WEAKNESS: 0
FREQUENCY: 0
MYALGIAS: 0
COUGH: 0
CONSTIPATION: 0
NAUSEA: 0
NERVOUS/ANXIOUS: 0
HEMATOCHEZIA: 0
ARTHRALGIAS: 1
HEADACHES: 0

## 2019-08-14 ASSESSMENT — MIFFLIN-ST. JEOR: SCORE: 2054.14

## 2019-08-14 NOTE — PATIENT INSTRUCTIONS
Change your blood pressure medication to    Lisinopril / HCTZ 10 / 12.5 mg    Check your BP over the next few weeks   Optimal BP readings: <130 / <85   Too high: >140 / >90   Very high: >180 / >105    In about 2 weeks, increase Lexapro to 20 mg per day    Wrist brace   Wear at minimum at nighttime, wear during the daytime as possible    If your thumb pain does not improve, contact me for a referral to see a hand specialist      Preventive Health Recommendations    Yearly exam:             See your health care provider every year in order to  o   Review health changes.   o   Discuss preventive care.        o   Review your medicines.   Shots: Get a flu shot each year. Get a tetanus shot every 10 years.     Nutrition:    Eat at least 5 servings of fruits and vegetables daily.     Eat whole-grain bread, whole-wheat pasta and brown rice instead of white grains and rice.     Get adequate Calcium and Vitamin D.     Lifestyle    Exercise for at least 150 minutes a week (30 minutes a day, 5 days a week). This will help you control your weight and prevent disease.     Limit alcohol to one drink per day.     No smoking.     Wear sunscreen to prevent skin cancer.     See your dentist every six months for an exam and cleaning.

## 2019-08-14 NOTE — PROGRESS NOTES
SUBJECTIVE:   CC: Akira Fritz is an 29 year old male who presents for preventative health visit.     Healthy Habits:     Getting at least 3 servings of Calcium per day:  Yes    Bi-annual eye exam:  Yes    Dental care twice a year:  Yes    Sleep apnea or symptoms of sleep apnea:  None    Diet:  Regular (no restrictions)    Frequency of exercise:  2-3 days/week    Duration of exercise:  15-30 minutes    Taking medications regularly:  Yes    Medication side effects:  None    PHQ-2 Total Score: 0    Additional concerns today:  Yes      Musculoskeletal problem      Duration: 2 months    Description  Location: Left thumb    Intensity:  moderate    Accompanying signs and symptoms: clicking noises    History  Previous similar problem: no   Previous evaluation:  none    Precipitating or alleviating factors:  Trauma or overuse: YES- bench press/ gym  Aggravating factors include: none    Therapies tried and outcome: support wrap  Extensor side of the aspect of the thumb     BP is elevated today. Taking HCTZ for treatment of his blood pressure.   No cardiac sx such as CP, palpitations, PND, orthopnea, LEMON or peripheral edema.  BP Readings from Last 3 Encounters:   08/14/19 (!) 146/104   07/18/18 132/84   06/09/17 130/86     Anxiety.   Noting increased symptoms over the past few months.  No specific trigger. Is interested in making medication changes to help with his mood.     Today's PHQ-2 Score:   PHQ-2 ( 1999 Pf[employer]) 8/14/2019   Q1: Little interest or pleasure in doing things 0   Q2: Feeling down, depressed or hopeless 0   PHQ-2 Score 0   Q1: Little interest or pleasure in doing things -   Q2: Feeling down, depressed or hopeless -   PHQ-2 Score -       Abuse: Current or Past(Physical, Sexual or Emotional)- No  Do you feel safe in your environment? Yes    Social History     Tobacco Use     Smoking status: Never Smoker     Smokeless tobacco: Never Used     Tobacco comment: non smoking home   Substance Use Topics      "Alcohol use: Not Currently     Alcohol/week: 0.0 oz     Comment: social     If you drink alcohol do you typically have >3 drinks per day or >7 drinks per week? No    Alcohol Use 8/14/2019   Prescreen: >3 drinks/day or >7 drinks/week? No   Prescreen: >3 drinks/day or >7 drinks/week? -       Reviewed orders with patient. Reviewed health maintenance and updated orders accordingly - Yes      Reviewed and updated as needed this visit by Provider  Tobacco  Allergies  Meds  Problems  Med Hx  Surg Hx  Fam Hx          Review of Systems   Constitutional: Negative for chills and fever.   HENT: Negative for congestion, ear pain, hearing loss and sore throat.    Eyes: Negative for pain and visual disturbance.   Respiratory: Negative for cough and shortness of breath.    Cardiovascular: Negative for chest pain, palpitations and peripheral edema.   Gastrointestinal: Negative for abdominal pain, constipation, diarrhea, heartburn, hematochezia and nausea.   Genitourinary: Negative for discharge, dysuria, frequency, genital sores, hematuria, impotence and urgency.   Musculoskeletal: Positive for arthralgias. Negative for joint swelling and myalgias.   Skin: Negative for rash.   Neurological: Negative for dizziness, weakness, headaches and paresthesias.   Psychiatric/Behavioral: Negative for mood changes. The patient is not nervous/anxious.        OBJECTIVE:   BP (!) 146/104   Pulse 132   Temp 98  F (36.7  C) (Oral)   Resp 16   Ht 1.935 m (6' 4.18\")   Wt 98 kg (216 lb)   SpO2 98%   BMI 26.17 kg/m      Physical Exam  GENERAL: healthy, alert and no distress  EYES: Eyes grossly normal to inspection, PERRL and conjunctivae and sclerae normal  HENT: ear canals and TM's normal, nose and mouth without ulcers or lesions  NECK: no adenopathy, no asymmetry, masses, or scars and thyroid normal to palpation  RESP: lungs clear to auscultation - no rales, rhonchi or wheezes  CV: regular rate and rhythm, normal S1 S2, no S3 or S4, no " "murmur, click or rub, no peripheral edema and peripheral pulses strong  ABDOMEN: soft, nontender, no hepatosplenomegaly, no masses and bowel sounds normal  : Deferred per pt  MS: discomfort w/ resisted extension of the left thumb. No effusion noted. Discomfort w/ palpation over the MCP joint.   SKIN: no suspicious lesions or rashes  NEURO: Normal strength and tone, mentation intact and speech normal  PSYCH: mentation appears normal, affect normal/bright    ASSESSMENT/PLAN:       ICD-10-CM    1. Routine general medical examination at a health care facility Z00.00 Basic metabolic panel     Lipid Profile (Chol, Trig, HDL, LDL calc)     HIV Antigen Antibody Combo   2. Essential hypertension I10 lisinopril-hydrochlorothiazide (PRINZIDE/ZESTORETIC) 10-12.5 MG tablet     Basic metabolic panel   3. Social phobia F40.10 escitalopram (LEXAPRO) 20 MG tablet   4. 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 Will change meds as above. See PI.  Anxiety - also not fully controlled. Increase Lexapro to 20 mg daily.  Thumb pain - clinically c/w RST. Wrist brace given. Home PT handout given. If sx do not improve, call for hand referral.    COUNSELING:   Reviewed preventive health counseling, as reflected in patient instructions    Estimated body mass index is 26.17 kg/m  as calculated from the following:    Height as of this encounter: 1.935 m (6' 4.18\").    Weight as of this encounter: 98 kg (216 lb).     Weight management plan: Discussed healthy diet and exercise guidelines     reports that he has never smoked. He has never used smokeless tobacco.      Gildardo Engel MD  [facility] [place]  "

## 2019-08-15 LAB
ANION GAP SERPL CALCULATED.3IONS-SCNC: 9 MMOL/L (ref 3–14)
BUN SERPL-MCNC: 19 MG/DL (ref 7–30)
CALCIUM SERPL-MCNC: 8.8 MG/DL (ref 8.5–10.1)
CHLORIDE SERPL-SCNC: 103 MMOL/L (ref 94–109)
CHOLEST SERPL-MCNC: 200 MG/DL
CO2 SERPL-SCNC: 27 MMOL/L (ref 20–32)
CREAT SERPL-MCNC: 0.78 MG/DL (ref 0.66–1.25)
GFR SERPL CREATININE-BSD FRML MDRD: >90 ML/MIN/{1.73_M2}
GLUCOSE SERPL-MCNC: 101 MG/DL (ref 70–99)
HDLC SERPL-MCNC: 51 MG/DL
HIV 1+2 AB+HIV1 P24 AG SERPL QL IA: NONREACTIVE
LDLC SERPL CALC-MCNC: 139 MG/DL
NONHDLC SERPL-MCNC: 149 MG/DL
POTASSIUM SERPL-SCNC: 3.2 MMOL/L (ref 3.4–5.3)
SODIUM SERPL-SCNC: 139 MMOL/L (ref 133–144)
TRIGL SERPL-MCNC: 52 MG/DL

## 2019-08-16 ENCOUNTER — MYC MEDICAL ADVICE (OUTPATIENT)
Dept: PEDIATRICS | Facility: CLINIC | Age: 29
End: 2019-08-16

## 2019-08-16 DIAGNOSIS — E87.6 HYPOKALEMIA: Primary | ICD-10-CM

## 2019-09-06 DIAGNOSIS — I10 ESSENTIAL HYPERTENSION: ICD-10-CM

## 2019-09-06 RX ORDER — LISINOPRIL/HYDROCHLOROTHIAZIDE 10-12.5 MG
TABLET ORAL
Qty: 30 TABLET | Refills: 1 | Status: SHIPPED | OUTPATIENT
Start: 2019-09-06 | End: 2019-10-29

## 2019-09-06 NOTE — TELEPHONE ENCOUNTER
"Requested Prescriptions   Pending Prescriptions Disp Refills     lisinopril-hydrochlorothiazide (PRINZIDE/ZESTORETIC) 10-12.5 MG tablet  Last Written Prescription Date:  08/14/2019  Last Fill Quantity: 30 tablet,  # refills: 1   Last Office Visit: 8/14/2019 Gildardo Engel MD   Future Office Visit:      [Pharmacy Med Name: LISINOPRIL-HCTZ 10-12.5 MG TAB] 30 tablet 1     Sig: TAKE 1 TABLET BY MOUTH EVERY DAY       Diuretics (Including Combos) Protocol Failed - 9/6/2019 11:32 AM        Failed - Blood pressure under 140/90 in past 12 months     BP Readings from Last 3 Encounters:   08/14/19 (!) 146/104   07/18/18 132/84   06/09/17 130/86             Failed - Normal serum potassium on file in past 12 months     Recent Labs   Lab Test 08/14/19  1207   POTASSIUM 3.2*              Passed - Recent (12 mo) or future (30 days) visit within the authorizing provider's specialty     Patient had office visit in the last 12 months or has a visit in the next 30 days with authorizing provider or within the authorizing provider's specialty.  See \"Patient Info\" tab in inbasket, or \"Choose Columns\" in Meds & Orders section of the refill encounter.              Passed - Medication is active on med list        Passed - Patient is age 18 or older        Passed - Normal serum creatinine on file in past 12 months     Recent Labs   Lab Test 08/14/19  1207   CR 0.78              Passed - Normal serum sodium on file in past 12 months     Recent Labs   Lab Test 08/14/19  1207                   "

## 2019-10-02 DIAGNOSIS — I10 ESSENTIAL HYPERTENSION: ICD-10-CM

## 2019-10-02 RX ORDER — LISINOPRIL/HYDROCHLOROTHIAZIDE 10-12.5 MG
TABLET ORAL
Qty: 30 TABLET | Refills: 1 | OUTPATIENT
Start: 2019-10-02

## 2019-10-02 NOTE — TELEPHONE ENCOUNTER
"Requested Prescriptions   Pending Prescriptions Disp Refills     lisinopril-hydrochlorothiazide (PRINZIDE/ZESTORETIC) 10-12.5 MG tablet [Pharmacy Med Name: LISINOPRIL-HCTZ 10-12.5 MG TAB]    Last Written Prescription Date:  9/6/2019  Last Fill Quantity: 30,  # refills: 1   Last office visit: 8/14/2019 with prescribing provider:  Gildardo Engel     Future Office Visit:     30 tablet 1     Sig: TAKE 1 TABLET BY MOUTH EVERY DAY       Diuretics (Including Combos) Protocol Failed - 10/2/2019 10:31 AM        Failed - Blood pressure under 140/90 in past 12 months     BP Readings from Last 3 Encounters:   08/14/19 (!) 146/104   07/18/18 132/84   06/09/17 130/86                 Failed - Normal serum potassium on file in past 12 months     Recent Labs   Lab Test 08/14/19  1207   POTASSIUM 3.2*                    Passed - Recent (12 mo) or future (30 days) visit within the authorizing provider's specialty     Patient has had an office visit with the authorizing provider or a provider within the authorizing providers department within the previous 12 mos or has a future within next 30 days. See \"Patient Info\" tab in inbasket, or \"Choose Columns\" in Meds & Orders section of the refill encounter.              Passed - Medication is active on med list        Passed - Patient is age 18 or older        Passed - Normal serum creatinine on file in past 12 months     Recent Labs   Lab Test 08/14/19  1207   CR 0.78              Passed - Normal serum sodium on file in past 12 months     Recent Labs   Lab Test 08/14/19  1207                   "

## 2019-11-04 ENCOUNTER — MYC MEDICAL ADVICE (OUTPATIENT)
Dept: PEDIATRICS | Facility: CLINIC | Age: 29
End: 2019-11-04

## 2019-11-04 DIAGNOSIS — I10 ESSENTIAL HYPERTENSION WITH GOAL BLOOD PRESSURE LESS THAN 140/90: ICD-10-CM

## 2019-11-06 RX ORDER — HYDROCHLOROTHIAZIDE 25 MG/1
25 TABLET ORAL EVERY MORNING
Qty: 90 TABLET | Refills: 1 | Status: SHIPPED | OUTPATIENT
Start: 2019-11-06 | End: 2020-01-08 | Stop reason: ALTCHOICE

## 2019-11-06 NOTE — TELEPHONE ENCOUNTER
BP Readings from Last 6 Encounters:   08/14/19 (!) 146/104   07/18/18 132/84   06/09/17 130/86   06/06/16 120/80   01/20/15 (!) 123/92   12/27/13 125/82     See patient's mychart regarding BP and BP meds.    Per OV 8/14:    Change your blood pressure medication to   Lisinopril / HCTZ 10 / 12.5 mg   Check your BP over the next few weeks   Optimal BP readings: <130 / <85   Too high: >140 / >90   Very high: >180 / >105

## 2019-12-26 ENCOUNTER — MYC MEDICAL ADVICE (OUTPATIENT)
Dept: PEDIATRICS | Facility: CLINIC | Age: 29
End: 2019-12-26

## 2020-02-16 ENCOUNTER — HEALTH MAINTENANCE LETTER (OUTPATIENT)
Age: 30
End: 2020-02-16

## 2020-02-17 ENCOUNTER — ALLIED HEALTH/NURSE VISIT (OUTPATIENT)
Dept: PEDIATRICS | Facility: CLINIC | Age: 30
End: 2020-02-17

## 2020-02-17 VITALS — SYSTOLIC BLOOD PRESSURE: 116 MMHG | DIASTOLIC BLOOD PRESSURE: 84 MMHG

## 2020-02-17 DIAGNOSIS — Z01.30 BP CHECK: Primary | ICD-10-CM

## 2020-02-17 DIAGNOSIS — E87.6 HYPOKALEMIA: ICD-10-CM

## 2020-02-17 PROCEDURE — 99207 ZZC NO CHARGE NURSE ONLY: CPT | Performed by: INTERNAL MEDICINE

## 2020-02-17 PROCEDURE — 36415 COLL VENOUS BLD VENIPUNCTURE: CPT | Performed by: INTERNAL MEDICINE

## 2020-02-17 PROCEDURE — 80048 BASIC METABOLIC PNL TOTAL CA: CPT | Performed by: INTERNAL MEDICINE

## 2020-02-17 NOTE — PROGRESS NOTES
Akira Fritz was evaluated at [facility] on February 17, 2020 at which time his blood pressure was:    BP Readings from Last 3 Encounters:   02/17/20 116/84   08/14/19 (!) 146/104   07/18/18 132/84     Pulse Readings from Last 3 Encounters:   08/14/19 132   07/18/18 112   06/09/17 99       Reviewed lifestyle modifications for blood pressure control and reduction: including making healthy food choices, managing weight, getting regular exercise, smoking cessation, reducing alcohol consumption, monitoring blood pressure regularly.     Symptoms: None    BP Goal:< 140/90 mmHg    BP Assessment:  BP at goal    Potential Reasons for BP too high: NA - Not applicable    BP Follow-Up Plan: Recheck BP in 6 months at pharmacy    Recommendation to Provider: n/a, I commented that Jose' pulse seemed fast and he said his MD is aware and not concerned    Note completed by: Thanks,  Jessica Puckett, PharmD  [facility] [place]

## 2020-02-18 LAB
ANION GAP SERPL CALCULATED.3IONS-SCNC: 7 MMOL/L (ref 3–14)
BUN SERPL-MCNC: 13 MG/DL (ref 7–30)
CALCIUM SERPL-MCNC: 9.4 MG/DL (ref 8.5–10.1)
CHLORIDE SERPL-SCNC: 104 MMOL/L (ref 94–109)
CO2 SERPL-SCNC: 27 MMOL/L (ref 20–32)
CREAT SERPL-MCNC: 0.8 MG/DL (ref 0.66–1.25)
GFR SERPL CREATININE-BSD FRML MDRD: >90 ML/MIN/{1.73_M2}
GLUCOSE SERPL-MCNC: 112 MG/DL (ref 70–99)
POTASSIUM SERPL-SCNC: 3.4 MMOL/L (ref 3.4–5.3)
SODIUM SERPL-SCNC: 138 MMOL/L (ref 133–144)

## 2020-02-19 ENCOUNTER — MYC MEDICAL ADVICE (OUTPATIENT)
Dept: PEDIATRICS | Facility: CLINIC | Age: 30
End: 2020-02-19

## 2020-02-19 DIAGNOSIS — F40.10 SOCIAL PHOBIA: Primary | ICD-10-CM

## 2020-02-19 RX ORDER — HYDROXYZINE HYDROCHLORIDE 25 MG/1
25-50 TABLET, FILM COATED ORAL EVERY 4 HOURS PRN
Qty: 30 TABLET | Refills: 2 | Status: SHIPPED | OUTPATIENT
Start: 2020-02-19 | End: 2023-11-01

## 2020-08-25 ASSESSMENT — ANXIETY QUESTIONNAIRES
3. WORRYING TOO MUCH ABOUT DIFFERENT THINGS: NOT AT ALL
GAD7 TOTAL SCORE: 2
GAD7 TOTAL SCORE: 2
6. BECOMING EASILY ANNOYED OR IRRITABLE: NOT AT ALL
7. FEELING AFRAID AS IF SOMETHING AWFUL MIGHT HAPPEN: NOT AT ALL
5. BEING SO RESTLESS THAT IT IS HARD TO SIT STILL: NOT AT ALL
2. NOT BEING ABLE TO STOP OR CONTROL WORRYING: NOT AT ALL
GAD7 TOTAL SCORE: 2
1. FEELING NERVOUS, ANXIOUS, OR ON EDGE: SEVERAL DAYS
7. FEELING AFRAID AS IF SOMETHING AWFUL MIGHT HAPPEN: NOT AT ALL
4. TROUBLE RELAXING: SEVERAL DAYS

## 2020-08-26 ENCOUNTER — VIRTUAL VISIT (OUTPATIENT)
Dept: PEDIATRICS | Facility: CLINIC | Age: 30
End: 2020-08-26
Payer: COMMERCIAL

## 2020-08-26 DIAGNOSIS — F40.10 SOCIAL PHOBIA: Primary | ICD-10-CM

## 2020-08-26 DIAGNOSIS — I10 ESSENTIAL HYPERTENSION: ICD-10-CM

## 2020-08-26 PROCEDURE — 99213 OFFICE O/P EST LOW 20 MIN: CPT | Mod: 95 | Performed by: INTERNAL MEDICINE

## 2020-08-26 RX ORDER — LISINOPRIL/HYDROCHLOROTHIAZIDE 10-12.5 MG
1 TABLET ORAL DAILY
Qty: 90 TABLET | Refills: 3 | Status: SHIPPED | OUTPATIENT
Start: 2020-08-26 | End: 2021-10-13

## 2020-08-26 ASSESSMENT — ANXIETY QUESTIONNAIRES: GAD7 TOTAL SCORE: 2

## 2020-08-26 NOTE — PATIENT INSTRUCTIONS
Start Prozac (fluoxetine) 20 mg daily    If this does not work well or causes side effects, change to Buspar (buspirone) 5 mg twice daily

## 2020-08-26 NOTE — PROGRESS NOTES
"Akira Fritz is a 30 year old male who is being evaluated via a billable video visit.      The patient has been notified of following:     \"This video visit will be conducted via a call between you and your physician/provider. We have found that certain health care needs can be provided without the need for an in-person physical exam.  This service lets us provide the care you need with a video conversation.  If a prescription is necessary we can send it directly to your pharmacy.  If lab work is needed we can place an order for that and you can then stop by our lab to have the test done at a later time.    Video visits are billed at different rates depending on your insurance coverage.  Please reach out to your insurance provider with any questions.    If during the course of the call the physician/provider feels a video visit is not appropriate, you will not be charged for this service.\"    Patient has given verbal consent for Video visit? Yes    Subjective     Akira Fritz is a 30 year old male who presents today via video visit for the following health issues:    HPI       Anxiety.  Stopped taking daily med last spring  Noting increased anxiety sx and would like to restart.  Takes hydroxyzine prn, does help.  In the past was rx Lexapro, prior to that Paxil.  Paxil seemed to work well but caused sexual side effects.    HTN. Treating w/ lisinopril/HCTZ.  110's / 70's typically when he checks at home.  BP Readings from Last 3 Encounters:   02/17/20 116/84   08/14/19 (!) 146/104   07/18/18 132/84     No cardiac sx such as CP, palpitations, PND, orthopnea, LEMON or peripheral edema.              Video Start Time: 11:28          Objective         Vitals:  No vitals were obtained today due to virtual visit.    Physical Exam     GEN: No distress  SKIN: No visible rashes  E: No eye drainage or icterus  ENT: No nasal discharge noted  NECK: Supple  LUNGS: No active cough, wheezing.   PSYCH: Normal affect. " Well groomed.   NEURO: No focal deficits appreciated               ICD-10-CM    1. Social phobia  F40.10    2. Essential hypertension  I10      Anxiety - not fully controlled.  Discussed medication options.  Will try Fluoxetine 20 mg daily.  If this does not work well or causes side effects, discussed changing to buspirone 5 mg BID    BPM for HTN management     Video-Visit Details    Type of service:  Video Visit    Video End Time:11:36 AM    Originating Location (pt. Location): Home    Distant Location (provider location):  [facility]     Platform used for Video Visit: Robert

## 2020-11-22 ENCOUNTER — HEALTH MAINTENANCE LETTER (OUTPATIENT)
Age: 30
End: 2020-11-22

## 2021-09-19 ENCOUNTER — HEALTH MAINTENANCE LETTER (OUTPATIENT)
Age: 31
End: 2021-09-19

## 2021-10-12 ASSESSMENT — ENCOUNTER SYMPTOMS
MYALGIAS: 0
CONSTIPATION: 0
DIZZINESS: 0
HEMATOCHEZIA: 0
JOINT SWELLING: 0
WEAKNESS: 0
NERVOUS/ANXIOUS: 0
HEMATURIA: 0
PALPITATIONS: 0
PARESTHESIAS: 0
DIARRHEA: 0
HEARTBURN: 0
HEADACHES: 0
SHORTNESS OF BREATH: 0
ABDOMINAL PAIN: 0
ARTHRALGIAS: 0
FREQUENCY: 0
CHILLS: 0
SORE THROAT: 0
FEVER: 0
DYSURIA: 0
EYE PAIN: 0
COUGH: 0
NAUSEA: 0

## 2021-10-13 ENCOUNTER — OFFICE VISIT (OUTPATIENT)
Dept: PEDIATRICS | Facility: CLINIC | Age: 31
End: 2021-10-13
Payer: COMMERCIAL

## 2021-10-13 VITALS
WEIGHT: 203.3 LBS | SYSTOLIC BLOOD PRESSURE: 132 MMHG | RESPIRATION RATE: 12 BRPM | BODY MASS INDEX: 24.76 KG/M2 | TEMPERATURE: 98.2 F | HEART RATE: 96 BPM | HEIGHT: 76 IN | OXYGEN SATURATION: 99 % | DIASTOLIC BLOOD PRESSURE: 86 MMHG

## 2021-10-13 DIAGNOSIS — I10 ESSENTIAL HYPERTENSION: ICD-10-CM

## 2021-10-13 DIAGNOSIS — F40.10 SOCIAL PHOBIA: ICD-10-CM

## 2021-10-13 DIAGNOSIS — Z00.00 ROUTINE GENERAL MEDICAL EXAMINATION AT A HEALTH CARE FACILITY: Primary | ICD-10-CM

## 2021-10-13 PROCEDURE — 36415 COLL VENOUS BLD VENIPUNCTURE: CPT | Performed by: INTERNAL MEDICINE

## 2021-10-13 PROCEDURE — 90686 IIV4 VACC NO PRSV 0.5 ML IM: CPT | Performed by: INTERNAL MEDICINE

## 2021-10-13 PROCEDURE — 80048 BASIC METABOLIC PNL TOTAL CA: CPT | Performed by: INTERNAL MEDICINE

## 2021-10-13 PROCEDURE — 80061 LIPID PANEL: CPT | Performed by: INTERNAL MEDICINE

## 2021-10-13 PROCEDURE — 99395 PREV VISIT EST AGE 18-39: CPT | Mod: 25 | Performed by: INTERNAL MEDICINE

## 2021-10-13 PROCEDURE — 90471 IMMUNIZATION ADMIN: CPT | Performed by: INTERNAL MEDICINE

## 2021-10-13 RX ORDER — LISINOPRIL/HYDROCHLOROTHIAZIDE 10-12.5 MG
1 TABLET ORAL DAILY
Qty: 90 TABLET | Refills: 3 | Status: SHIPPED | OUTPATIENT
Start: 2021-10-13 | End: 2022-10-26

## 2021-10-13 ASSESSMENT — ENCOUNTER SYMPTOMS
DYSURIA: 0
WEAKNESS: 0
HEMATURIA: 0
FREQUENCY: 0
PALPITATIONS: 0
DIZZINESS: 0
DIARRHEA: 0
ABDOMINAL PAIN: 0
MYALGIAS: 0
COUGH: 0
NERVOUS/ANXIOUS: 0
HEADACHES: 0
PARESTHESIAS: 0
CONSTIPATION: 0
HEARTBURN: 0
EYE PAIN: 0
CHILLS: 0
SHORTNESS OF BREATH: 0
FEVER: 0
JOINT SWELLING: 0
HEMATOCHEZIA: 0
NAUSEA: 0
SORE THROAT: 0
ARTHRALGIAS: 0

## 2021-10-13 ASSESSMENT — MIFFLIN-ST. JEOR: SCORE: 1978.41

## 2021-10-13 NOTE — PROGRESS NOTES
SUBJECTIVE:   CC: Akira Fritz is an 31 year old male who presents for preventative health visit.     Patient has been advised of split billing requirements and indicates understanding: Yes  Healthy Habits:     Getting at least 3 servings of Calcium per day:  Yes    Bi-annual eye exam:  Yes    Dental care twice a year:  Yes    Sleep apnea or symptoms of sleep apnea:  None    Diet:  Regular (no restrictions)    Frequency of exercise:  2-3 days/week    Duration of exercise:  30-45 minutes    Taking medications regularly:  Yes    Medication side effects:  None    PHQ-2 Total Score: 0    Additional concerns today:  No    Overall feeling well.    HTN. No cardiac sx such as CP, palpitations, PND, orthopnea, LEMON or peripheral edema.  BP Readings from Last 3 Encounters:   10/13/21 132/86   02/17/20 116/84   08/14/19 (!) 146/104     Anxiety / social phobia.   Helped by fluoxetine. Would like to CPM.    Today's PHQ-2 Score:   PHQ-2 ( 1999 Pfizer) 10/12/2021   Q1: Little interest or pleasure in doing things 0   Q2: Feeling down, depressed or hopeless 0   PHQ-2 Score 0   Q1: Little interest or pleasure in doing things Not at all   Q2: Feeling down, depressed or hopeless Not at all   PHQ-2 Score 0       Abuse: Current or Past(Physical, Sexual or Emotional)- No  Do you feel safe in your environment? Yes    Have you ever done Advance Care Planning? (For example, a Health Directive, POLST, or a discussion with a medical provider or your loved ones about your wishes): No, advance care planning information given to patient to review.  Patient declined advance care planning discussion at this time.    Social History     Tobacco Use     Smoking status: Never Smoker     Smokeless tobacco: Never Used     Tobacco comment: non smoking home   Substance Use Topics     Alcohol use: Yes     Alcohol/week: 0.0 standard drinks     Comment: Drink socially     If you drink alcohol do you typically have >3 drinks per day or >7 drinks per  "week? No    Alcohol Use 10/12/2021   Prescreen: >3 drinks/day or >7 drinks/week? No   Prescreen: >3 drinks/day or >7 drinks/week? -     Reviewed orders with patient. Reviewed health maintenance and updated orders accordingly - Yes      Review of Systems   Constitutional: Negative for chills and fever.   HENT: Negative for congestion, ear pain, hearing loss and sore throat.    Eyes: Negative for pain and visual disturbance.   Respiratory: Negative for cough and shortness of breath.    Cardiovascular: Negative for chest pain, palpitations and peripheral edema.   Gastrointestinal: Negative for abdominal pain, constipation, diarrhea, heartburn, hematochezia and nausea.   Genitourinary: Negative for discharge, dysuria, frequency, genital sores, hematuria, impotence and urgency.   Musculoskeletal: Negative for arthralgias, joint swelling and myalgias.   Skin: Negative for rash.   Neurological: Negative for dizziness, weakness, headaches and paresthesias.   Psychiatric/Behavioral: Negative for mood changes. The patient is not nervous/anxious.      OBJECTIVE:   /86 (BP Location: Right arm, Patient Position: Sitting, Cuff Size: Adult Large)   Pulse (!) 128   Temp 98.2  F (36.8  C) (Temporal)   Resp 12   Ht 1.922 m (6' 3.67\")   Wt 92.2 kg (203 lb 4.8 oz)   SpO2 99%   BMI 24.96 kg/m      Physical Exam  GENERAL: healthy, alert and no distress  EYES: Eyes grossly normal to inspection, PERRL and conjunctivae and sclerae normal  HENT: ear canals and TM's normal  NECK: no adenopathy, thyroid normal to palpation  RESP: lungs clear to auscultation - no rales, rhonchi or wheezes  CV: regular rate and rhythm, normal S1 S2, no murmur, no peripheral edema and peripheral pulses strong  ABDOMEN: soft, nontender, no hepatosplenomegaly, no masses and bowel sounds normal]  : Normal male. No testicular lesions, tenderness, skin lesions, discharge or inguinal hernias palpated.   MS: no gross musculoskeletal defects noted, no " "edema  SKIN: no suspicious lesions or rashes  NEURO: Normal strength and tone, mentation intact and speech normal  PSYCH: mentation appears normal, affect normal/bright      ASSESSMENT/PLAN:       ICD-10-CM    1. Routine general medical examination at a health care facility  Z00.00 INFLUENZA VACCINE IM > 6 MONTHS VALENT IIV4 (AFLURIA/FLUZONE)     Lipid panel reflex to direct LDL Fasting     Basic metabolic panel   2. Essential hypertension  I10 lisinopril-hydrochlorothiazide (ZESTORETIC) 10-12.5 MG tablet   3. Social phobia  F40.10 FLUoxetine (PROZAC) 20 MG capsule         COUNSELING:   Reviewed preventive health counseling, as reflected in patient instructions    Estimated body mass index is 24.96 kg/m  as calculated from the following:    Height as of this encounter: 1.922 m (6' 3.67\").    Weight as of this encounter: 92.2 kg (203 lb 4.8 oz).       He reports that he has never smoked. He has never used smokeless tobacco.      Gildardo Engel MD  [facility] [place]  "

## 2021-10-14 LAB
ANION GAP SERPL CALCULATED.3IONS-SCNC: 9 MMOL/L (ref 3–14)
BUN SERPL-MCNC: 12 MG/DL (ref 7–30)
CALCIUM SERPL-MCNC: 9.2 MG/DL (ref 8.5–10.1)
CHLORIDE BLD-SCNC: 104 MMOL/L (ref 94–109)
CHOLEST SERPL-MCNC: 165 MG/DL
CO2 SERPL-SCNC: 25 MMOL/L (ref 20–32)
CREAT SERPL-MCNC: 1.08 MG/DL (ref 0.66–1.25)
FASTING STATUS PATIENT QL REPORTED: YES
GFR SERPL CREATININE-BSD FRML MDRD: >90 ML/MIN/1.73M2
GLUCOSE BLD-MCNC: 84 MG/DL (ref 70–99)
HDLC SERPL-MCNC: 79 MG/DL
LDLC SERPL CALC-MCNC: 78 MG/DL
NONHDLC SERPL-MCNC: 86 MG/DL
POTASSIUM BLD-SCNC: 3.4 MMOL/L (ref 3.4–5.3)
SODIUM SERPL-SCNC: 138 MMOL/L (ref 133–144)
TRIGL SERPL-MCNC: 42 MG/DL

## 2021-10-16 DIAGNOSIS — F40.10 SOCIAL PHOBIA: ICD-10-CM

## 2021-11-30 DIAGNOSIS — I10 ESSENTIAL HYPERTENSION: ICD-10-CM

## 2021-12-01 RX ORDER — LISINOPRIL/HYDROCHLOROTHIAZIDE 10-12.5 MG
TABLET ORAL
Qty: 90 TABLET | Refills: 3 | OUTPATIENT
Start: 2021-12-01

## 2022-10-25 SDOH — ECONOMIC STABILITY: INCOME INSECURITY: HOW HARD IS IT FOR YOU TO PAY FOR THE VERY BASICS LIKE FOOD, HOUSING, MEDICAL CARE, AND HEATING?: NOT HARD AT ALL

## 2022-10-25 SDOH — ECONOMIC STABILITY: FOOD INSECURITY: WITHIN THE PAST 12 MONTHS, YOU WORRIED THAT YOUR FOOD WOULD RUN OUT BEFORE YOU GOT MONEY TO BUY MORE.: NEVER TRUE

## 2022-10-25 SDOH — ECONOMIC STABILITY: FOOD INSECURITY: WITHIN THE PAST 12 MONTHS, THE FOOD YOU BOUGHT JUST DIDN'T LAST AND YOU DIDN'T HAVE MONEY TO GET MORE.: NEVER TRUE

## 2022-10-25 SDOH — HEALTH STABILITY: PHYSICAL HEALTH: ON AVERAGE, HOW MANY MINUTES DO YOU ENGAGE IN EXERCISE AT THIS LEVEL?: 60 MIN

## 2022-10-25 SDOH — ECONOMIC STABILITY: INCOME INSECURITY: IN THE LAST 12 MONTHS, WAS THERE A TIME WHEN YOU WERE NOT ABLE TO PAY THE MORTGAGE OR RENT ON TIME?: NO

## 2022-10-25 SDOH — ECONOMIC STABILITY: TRANSPORTATION INSECURITY
IN THE PAST 12 MONTHS, HAS THE LACK OF TRANSPORTATION KEPT YOU FROM MEDICAL APPOINTMENTS OR FROM GETTING MEDICATIONS?: NO

## 2022-10-25 SDOH — HEALTH STABILITY: PHYSICAL HEALTH: ON AVERAGE, HOW MANY DAYS PER WEEK DO YOU ENGAGE IN MODERATE TO STRENUOUS EXERCISE (LIKE A BRISK WALK)?: 4 DAYS

## 2022-10-25 SDOH — ECONOMIC STABILITY: TRANSPORTATION INSECURITY
IN THE PAST 12 MONTHS, HAS LACK OF TRANSPORTATION KEPT YOU FROM MEETINGS, WORK, OR FROM GETTING THINGS NEEDED FOR DAILY LIVING?: NO

## 2022-10-25 ASSESSMENT — ENCOUNTER SYMPTOMS
COUGH: 0
DYSURIA: 0
DIZZINESS: 0
FREQUENCY: 0
DIARRHEA: 0
HEARTBURN: 0
PARESTHESIAS: 0
JOINT SWELLING: 0
SHORTNESS OF BREATH: 0
PALPITATIONS: 0
NERVOUS/ANXIOUS: 0
EYE PAIN: 0
ABDOMINAL PAIN: 0
ARTHRALGIAS: 0
NAUSEA: 0
SORE THROAT: 0
HEMATOCHEZIA: 0
MYALGIAS: 0
HEADACHES: 0
HEMATURIA: 0
CONSTIPATION: 0
WEAKNESS: 0
CHILLS: 0
FEVER: 0

## 2022-10-25 ASSESSMENT — SOCIAL DETERMINANTS OF HEALTH (SDOH)
ARE YOU MARRIED, WIDOWED, DIVORCED, SEPARATED, NEVER MARRIED, OR LIVING WITH A PARTNER?: NEVER MARRIED
HOW OFTEN DO YOU GET TOGETHER WITH FRIENDS OR RELATIVES?: MORE THAN THREE TIMES A WEEK
DO YOU BELONG TO ANY CLUBS OR ORGANIZATIONS SUCH AS CHURCH GROUPS UNIONS, FRATERNAL OR ATHLETIC GROUPS, OR SCHOOL GROUPS?: NO
HOW OFTEN DO YOU ATTEND CHURCH OR RELIGIOUS SERVICES?: 1 TO 4 TIMES PER YEAR
IN A TYPICAL WEEK, HOW MANY TIMES DO YOU TALK ON THE PHONE WITH FAMILY, FRIENDS, OR NEIGHBORS?: MORE THAN THREE TIMES A WEEK

## 2022-10-25 ASSESSMENT — LIFESTYLE VARIABLES
HOW MANY STANDARD DRINKS CONTAINING ALCOHOL DO YOU HAVE ON A TYPICAL DAY: 1 OR 2
SKIP TO QUESTIONS 9-10: 1
AUDIT-C TOTAL SCORE: 2
HOW OFTEN DO YOU HAVE SIX OR MORE DRINKS ON ONE OCCASION: NEVER
HOW OFTEN DO YOU HAVE A DRINK CONTAINING ALCOHOL: 2-4 TIMES A MONTH

## 2022-10-26 ENCOUNTER — OFFICE VISIT (OUTPATIENT)
Dept: PEDIATRICS | Facility: CLINIC | Age: 32
End: 2022-10-26
Payer: COMMERCIAL

## 2022-10-26 VITALS
HEART RATE: 130 BPM | RESPIRATION RATE: 16 BRPM | BODY MASS INDEX: 25.01 KG/M2 | HEIGHT: 76 IN | OXYGEN SATURATION: 100 % | DIASTOLIC BLOOD PRESSURE: 64 MMHG | TEMPERATURE: 98 F | SYSTOLIC BLOOD PRESSURE: 120 MMHG | WEIGHT: 205.4 LBS

## 2022-10-26 DIAGNOSIS — Z00.00 ROUTINE GENERAL MEDICAL EXAMINATION AT A HEALTH CARE FACILITY: Primary | ICD-10-CM

## 2022-10-26 DIAGNOSIS — I10 ESSENTIAL HYPERTENSION: ICD-10-CM

## 2022-10-26 DIAGNOSIS — F40.10 SOCIAL PHOBIA: ICD-10-CM

## 2022-10-26 PROCEDURE — 90471 IMMUNIZATION ADMIN: CPT | Performed by: INTERNAL MEDICINE

## 2022-10-26 PROCEDURE — 99213 OFFICE O/P EST LOW 20 MIN: CPT | Mod: 25 | Performed by: INTERNAL MEDICINE

## 2022-10-26 PROCEDURE — 99395 PREV VISIT EST AGE 18-39: CPT | Mod: 25 | Performed by: INTERNAL MEDICINE

## 2022-10-26 PROCEDURE — 80048 BASIC METABOLIC PNL TOTAL CA: CPT | Performed by: INTERNAL MEDICINE

## 2022-10-26 PROCEDURE — 36415 COLL VENOUS BLD VENIPUNCTURE: CPT | Performed by: INTERNAL MEDICINE

## 2022-10-26 PROCEDURE — 90686 IIV4 VACC NO PRSV 0.5 ML IM: CPT | Performed by: INTERNAL MEDICINE

## 2022-10-26 PROCEDURE — 0124A COVID-19,PF,PFIZER BOOSTER BIVALENT: CPT | Performed by: INTERNAL MEDICINE

## 2022-10-26 PROCEDURE — 91312 COVID-19,PF,PFIZER BOOSTER BIVALENT: CPT | Performed by: INTERNAL MEDICINE

## 2022-10-26 RX ORDER — BUSPIRONE HYDROCHLORIDE 5 MG/1
TABLET ORAL
Qty: 90 TABLET | Refills: 0 | Status: SHIPPED | OUTPATIENT
Start: 2022-10-26 | End: 2022-11-21

## 2022-10-26 RX ORDER — LISINOPRIL/HYDROCHLOROTHIAZIDE 10-12.5 MG
1 TABLET ORAL DAILY
Qty: 90 TABLET | Refills: 3 | Status: SHIPPED | OUTPATIENT
Start: 2022-10-26 | End: 2023-11-01

## 2022-10-26 ASSESSMENT — ENCOUNTER SYMPTOMS
JOINT SWELLING: 0
COUGH: 0
FEVER: 0
PARESTHESIAS: 0
FREQUENCY: 0
CHILLS: 0
DIZZINESS: 0
HEADACHES: 0
HEMATOCHEZIA: 0
NERVOUS/ANXIOUS: 0
DIARRHEA: 0
CONSTIPATION: 0
NAUSEA: 0
ARTHRALGIAS: 0
SHORTNESS OF BREATH: 0
MYALGIAS: 0
SORE THROAT: 0
HEARTBURN: 0
ABDOMINAL PAIN: 0
EYE PAIN: 0
WEAKNESS: 0
DYSURIA: 0
HEMATURIA: 0
PALPITATIONS: 0

## 2022-10-26 ASSESSMENT — PAIN SCALES - GENERAL: PAINLEVEL: NO PAIN (0)

## 2022-10-26 NOTE — PROGRESS NOTES
SUBJECTIVE:   CC: Jose is an 32 year old who presents for preventative health visit.       Patient has been advised of split billing requirements and indicates understanding: Yes  Healthy Habits:     Getting at least 3 servings of Calcium per day:  Yes    Bi-annual eye exam:  Yes    Dental care twice a year:  Yes    Sleep apnea or symptoms of sleep apnea:  None    Diet:  Regular (no restrictions)    Frequency of exercise:  4-5 days/week    Duration of exercise:  45-60 minutes    Taking medications regularly:  Yes    Medication side effects:  None    PHQ-2 Total Score: 0    Additional concerns today:  No    Here for CPE. Overall feeling well.    HTN. No cardiac sx such as CP, palpitations, PND, orthopnea, LEMON or peripheral edema.  BP Readings from Last 3 Encounters:   10/26/22 120/64   10/13/21 132/86   02/17/20 116/84     Anxiety. Has been managed with selective serotonin reuptake inhibitor.  He would like to change to a different medication. Noting decreased libido with fluoxetine. Reviewed options and discussed escitalopram or buspirone. He is most interested in trying buspirone.           Today's PHQ-2 Score:   PHQ-2 ( 1999 Pfizer) 10/25/2022   Q1: Little interest or pleasure in doing things 0   Q2: Feeling down, depressed or hopeless 0   PHQ-2 Score 0   PHQ-2 Total Score (12-17 Years)- Positive if 3 or more points; Administer PHQ-A if positive -   Q1: Little interest or pleasure in doing things Not at all   Q2: Feeling down, depressed or hopeless Not at all   PHQ-2 Score 0       Abuse: Current or Past(Physical, Sexual or Emotional)- No  Do you feel safe in your environment? Yes        Social History     Tobacco Use     Smoking status: Never     Smokeless tobacco: Never     Tobacco comments:     non smoking home   Substance Use Topics     Alcohol use: Yes     Alcohol/week: 0.0 standard drinks     Comment: Drink socially         Alcohol Use 10/25/2022   Prescreen: >3 drinks/day or >7 drinks/week? No   Prescreen:  ">3 drinks/day or >7 drinks/week? -     Reviewed orders with patient. Reviewed health maintenance and updated orders accordingly - Yes      Review of Systems   Constitutional: Negative for chills and fever.   HENT: Negative for congestion, ear pain, hearing loss and sore throat.    Eyes: Negative for pain and visual disturbance.   Respiratory: Negative for cough and shortness of breath.    Cardiovascular: Negative for chest pain, palpitations and peripheral edema.   Gastrointestinal: Negative for abdominal pain, constipation, diarrhea, heartburn, hematochezia and nausea.   Genitourinary: Positive for impotence. Negative for dysuria, frequency, genital sores, hematuria, penile discharge and urgency.   Musculoskeletal: Negative for arthralgias, joint swelling and myalgias.   Skin: Negative for rash.   Neurological: Negative for dizziness, weakness, headaches and paresthesias.   Psychiatric/Behavioral: Negative for mood changes. The patient is not nervous/anxious.          OBJECTIVE:   /64 (BP Location: Right arm, Patient Position: Sitting, Cuff Size: Adult Large)   Pulse (!) 130   Temp 98  F (36.7  C) (Tympanic)   Resp 16   Ht 1.918 m (6' 3.5\")   Wt 93.2 kg (205 lb 6.4 oz)   SpO2 100%   BMI 25.33 kg/m      Physical Exam  GENERAL: healthy, alert and no distress  EYES: PERRL, EOMI  HENT: ear canals and TM's normal  NECK: no adenopathy  RESP: lungs clear to auscultation - no rales, rhonchi or wheezes  CV: regular rate and rhythm, normal S1 S2, no murmur, no peripheral edema and peripheral pulses strong  ABDOMEN: soft, nontender, bowel sounds normal  MS: no gross musculoskeletal defects noted, no edema  SKIN: no suspicious lesions or rashes  NEURO: Normal strength and tone  PSYCH: mentation appears normal, affect normal/bright       ASSESSMENT/PLAN:       ICD-10-CM    1. Routine general medical examination at a health care facility  Z00.00 INFLUENZA VACCINE IM > 6 MONTHS VALENT IIV4 (AFLURIA/FLUZONE)     " "COVID-19,PF,PFIZER BOOSTER BIVALENT 12+Yrs     Basic metabolic panel      2. Social phobia  F40.10 busPIRone (BUSPAR) 5 MG tablet     OFFICE/OUTPT VISIT,[facility]      3. Essential hypertension  I10 lisinopril-hydrochlorothiazide (ZESTORETIC) 10-12.5 MG tablet        Change from fluoxetine to buspirone. Reviewed dose titration. He can stay at a dose if that provides good symptom relief. If he is not having good relief at 10 mg can increase to 15 mg with his next prescription.      COUNSELING:   Reviewed preventive health counseling, as reflected in patient instructions    Estimated body mass index is 25.33 kg/m  as calculated from the following:    Height as of this encounter: 1.918 m (6' 3.5\").    Weight as of this encounter: 93.2 kg (205 lb 6.4 oz).       He reports that he has never smoked. He has never used smokeless tobacco.      Gildardo Engel MD  [facility] [place]  "

## 2022-10-27 LAB
ANION GAP SERPL CALCULATED.3IONS-SCNC: 9 MMOL/L (ref 3–14)
BUN SERPL-MCNC: 10 MG/DL (ref 7–30)
CALCIUM SERPL-MCNC: 9.8 MG/DL (ref 8.5–10.1)
CHLORIDE BLD-SCNC: 104 MMOL/L (ref 94–109)
CO2 SERPL-SCNC: 26 MMOL/L (ref 20–32)
CREAT SERPL-MCNC: 0.98 MG/DL (ref 0.66–1.25)
GFR SERPL CREATININE-BSD FRML MDRD: >90 ML/MIN/1.73M2
GLUCOSE BLD-MCNC: 98 MG/DL (ref 70–99)
POTASSIUM BLD-SCNC: 3.7 MMOL/L (ref 3.4–5.3)
SODIUM SERPL-SCNC: 139 MMOL/L (ref 133–144)

## 2022-11-18 DIAGNOSIS — F40.10 SOCIAL PHOBIA: ICD-10-CM

## 2022-11-21 ENCOUNTER — MYC REFILL (OUTPATIENT)
Dept: PEDIATRICS | Facility: CLINIC | Age: 32
End: 2022-11-21

## 2022-11-21 DIAGNOSIS — F40.10 SOCIAL PHOBIA: ICD-10-CM

## 2022-11-21 RX ORDER — BUSPIRONE HYDROCHLORIDE 10 MG/1
10 TABLET ORAL 2 TIMES DAILY
Qty: 180 TABLET | Refills: 3 | Status: SHIPPED | OUTPATIENT
Start: 2022-11-21 | End: 2023-11-01

## 2022-11-21 RX ORDER — BUSPIRONE HYDROCHLORIDE 5 MG/1
TABLET ORAL
Qty: 90 TABLET | Refills: 0 | Status: CANCELLED | OUTPATIENT
Start: 2022-11-21 | End: 2022-12-21

## 2022-11-21 NOTE — TELEPHONE ENCOUNTER
Routing refill request to provider for review/approval because:  Sig needs updating  Teressa Rudd RN

## 2023-10-30 ASSESSMENT — ENCOUNTER SYMPTOMS
WEAKNESS: 0
CHILLS: 0
DYSURIA: 0
DIZZINESS: 0
MYALGIAS: 0
COUGH: 0
HEARTBURN: 0
DIARRHEA: 0
JOINT SWELLING: 0
SORE THROAT: 0
NAUSEA: 0
NERVOUS/ANXIOUS: 0
CONSTIPATION: 0
PALPITATIONS: 1
FREQUENCY: 0
HEADACHES: 0
PARESTHESIAS: 0
HEMATOCHEZIA: 0
EYE PAIN: 0
FEVER: 0
SHORTNESS OF BREATH: 0
ABDOMINAL PAIN: 0
ARTHRALGIAS: 0
HEMATURIA: 0

## 2023-11-01 ENCOUNTER — OFFICE VISIT (OUTPATIENT)
Dept: PEDIATRICS | Facility: CLINIC | Age: 33
End: 2023-11-01
Payer: COMMERCIAL

## 2023-11-01 VITALS
SYSTOLIC BLOOD PRESSURE: 113 MMHG | OXYGEN SATURATION: 100 % | TEMPERATURE: 97.9 F | WEIGHT: 204.8 LBS | DIASTOLIC BLOOD PRESSURE: 70 MMHG | BODY MASS INDEX: 24.94 KG/M2 | HEIGHT: 76 IN | RESPIRATION RATE: 16 BRPM | HEART RATE: 94 BPM

## 2023-11-01 DIAGNOSIS — Z23 NEED FOR VACCINATION: ICD-10-CM

## 2023-11-01 DIAGNOSIS — Z00.00 ROUTINE GENERAL MEDICAL EXAMINATION AT A HEALTH CARE FACILITY: Primary | ICD-10-CM

## 2023-11-01 DIAGNOSIS — F40.10 SOCIAL PHOBIA: ICD-10-CM

## 2023-11-01 DIAGNOSIS — I10 ESSENTIAL HYPERTENSION: ICD-10-CM

## 2023-11-01 PROCEDURE — 80061 LIPID PANEL: CPT | Performed by: INTERNAL MEDICINE

## 2023-11-01 PROCEDURE — 90471 IMMUNIZATION ADMIN: CPT | Performed by: INTERNAL MEDICINE

## 2023-11-01 PROCEDURE — 90686 IIV4 VACC NO PRSV 0.5 ML IM: CPT | Performed by: INTERNAL MEDICINE

## 2023-11-01 PROCEDURE — 36415 COLL VENOUS BLD VENIPUNCTURE: CPT | Performed by: INTERNAL MEDICINE

## 2023-11-01 PROCEDURE — 80048 BASIC METABOLIC PNL TOTAL CA: CPT | Performed by: INTERNAL MEDICINE

## 2023-11-01 PROCEDURE — 99395 PREV VISIT EST AGE 18-39: CPT | Mod: 25 | Performed by: INTERNAL MEDICINE

## 2023-11-01 RX ORDER — LISINOPRIL/HYDROCHLOROTHIAZIDE 10-12.5 MG
1 TABLET ORAL DAILY
Qty: 90 TABLET | Refills: 4 | Status: SHIPPED | OUTPATIENT
Start: 2023-11-01

## 2023-11-01 RX ORDER — HYDROXYZINE HYDROCHLORIDE 25 MG/1
25-50 TABLET, FILM COATED ORAL EVERY 4 HOURS PRN
Qty: 30 TABLET | Refills: 2 | Status: SHIPPED | OUTPATIENT
Start: 2023-11-01 | End: 2024-06-17

## 2023-11-01 RX ORDER — BUSPIRONE HYDROCHLORIDE 10 MG/1
10 TABLET ORAL 2 TIMES DAILY
Qty: 180 TABLET | Refills: 4 | Status: SHIPPED | OUTPATIENT
Start: 2023-11-01

## 2023-11-01 ASSESSMENT — ENCOUNTER SYMPTOMS
COUGH: 0
NAUSEA: 0
FEVER: 0
CHILLS: 0
HEMATOCHEZIA: 0
PALPITATIONS: 1
FREQUENCY: 0
DIZZINESS: 0
JOINT SWELLING: 0
SHORTNESS OF BREATH: 0
PARESTHESIAS: 0
SORE THROAT: 0
CONSTIPATION: 0
NERVOUS/ANXIOUS: 0
WEAKNESS: 0
DIARRHEA: 0
HEARTBURN: 0
ARTHRALGIAS: 0
EYE PAIN: 0
HEMATURIA: 0
ABDOMINAL PAIN: 0
HEADACHES: 0
DYSURIA: 0
MYALGIAS: 0

## 2023-11-01 ASSESSMENT — PAIN SCALES - GENERAL: PAINLEVEL: NO PAIN (0)

## 2023-11-01 NOTE — PROGRESS NOTES
SUBJECTIVE:   CC: Jose is an 33 year old who presents for preventative health visit.       11/1/2023     5:06 PM   Additional Questions   Roomed by [facility]         11/1/2023     5:06 PM   Patient Reported Additional Medications   Patient reports taking the following new medications NONE       Healthy Habits:     Getting at least 3 servings of Calcium per day:  Yes    Bi-annual eye exam:  Yes    Dental care twice a year:  Yes    Sleep apnea or symptoms of sleep apnea:  None    Diet:  Regular (no restrictions)    Frequency of exercise:  2-3 days/week    Duration of exercise:  45-60 minutes    Taking medications regularly:  Yes    Medication side effects:  None    Additional concerns today:  Yes      Today's PHQ-2 Score:       11/1/2023     5:05 PM   PHQ-2 ( 1999 Pfizer)   Q1: Little interest or pleasure in doing things 0   Q2: Feeling down, depressed or hopeless 0   PHQ-2 Score 0   Q1: Little interest or pleasure in doing things Not at all   Q2: Feeling down, depressed or hopeless Not at all   PHQ-2 Score 0     Here for CPE. Overall he is feeling well.    Anxiety history. He feels that his mood is well controlled.  Does have some issues w/ insomnia at times. Discussed can use hydroxyzine prn.     HTN. No cardiac sx such as CP, palpitations, PND, orthopnea, LEMON or peripheral edema.  BP Readings from Last 3 Encounters:   11/01/23 113/70   10/26/22 120/64   10/13/21 132/86            Social History     Tobacco Use    Smoking status: Never    Smokeless tobacco: Never    Tobacco comments:     non smoking home   Substance Use Topics    Alcohol use: Yes     Comment: Drink socially             10/30/2023    10:52 AM   Alcohol Use   Prescreen: >3 drinks/day or >7 drinks/week? No       Reviewed orders with patient. Reviewed health maintenance and updated orders accordingly - Yes    Review of Systems   Constitutional:  Negative for chills and fever.   HENT:  Negative for congestion, ear pain, hearing loss and sore throat.    Eyes:   "Negative for pain and visual disturbance.   Respiratory:  Negative for cough and shortness of breath.    Cardiovascular:  Positive for palpitations. Negative for chest pain and peripheral edema.   Gastrointestinal:  Negative for abdominal pain, constipation, diarrhea, heartburn, hematochezia and nausea.   Genitourinary:  Negative for dysuria, frequency, genital sores, hematuria, impotence, penile discharge and urgency.   Musculoskeletal:  Negative for arthralgias, joint swelling and myalgias.   Skin:  Negative for rash.   Neurological:  Negative for dizziness, weakness, headaches and paresthesias.   Psychiatric/Behavioral:  Negative for mood changes. The patient is not nervous/anxious.          OBJECTIVE:   /70 (BP Location: Right arm, Patient Position: Sitting, Cuff Size: Adult Large)   Pulse 94   Temp 97.9  F (36.6  C) (Tympanic)   Resp 16   Ht 1.924 m (6' 3.75\")   Wt 92.9 kg (204 lb 12.8 oz)   SpO2 100%   BMI 25.10 kg/m      Physical Exam  GENERAL: healthy, alert and no distress  EYES: PERRL, EOMI  HENT: ear canals and TM's normal. No nasal discharge. OP moist.  NECK: no adenopathy  RESP: lungs clear to auscultation - no rales, rhonchi or wheezes  CV: regular rate and rhythm, normal S1 S2, no murmur, no peripheral edema and peripheral pulses strong  ABDOMEN: soft, nontender, bowel sounds normal  MS: no gross musculoskeletal defects noted  SKIN: no suspicious lesions or rashes  NEURO: Normal strength and tone  PSYCH: mentation appears normal, affect normal/bright         ASSESSMENT/PLAN:       ICD-10-CM    1. Routine general medical examination at a health care facility  Z00.00 Lipid panel reflex to direct LDL Fasting     Basic metabolic panel     Lipid panel reflex to direct LDL Fasting     Basic metabolic panel      2. Social phobia  F40.10 busPIRone (BUSPAR) 10 MG tablet     hydrOXYzine (ATARAX) 25 MG tablet      3. Essential hypertension  I10 lisinopril-hydrochlorothiazide (ZESTORETIC) 10-12.5 MG " tablet      4. Need for vaccination  Z23 INFLUENZA VACCINE IM > 6 MONTHS VALENT IIV4 (AFLURIA/FLUZONE)        Overall doing well.  No med changes today.  Fasting labwork ordered.           COUNSELING:   Reviewed preventive health counseling, as reflected in patient instructions        He reports that he has never smoked. He has never used smokeless tobacco.          Gildardo Engel MD  [facility]

## 2023-11-02 LAB
ANION GAP SERPL CALCULATED.3IONS-SCNC: 16 MMOL/L (ref 7–15)
BUN SERPL-MCNC: 11.2 MG/DL (ref 6–20)
CALCIUM SERPL-MCNC: 10 MG/DL (ref 8.6–10)
CHLORIDE SERPL-SCNC: 100 MMOL/L (ref 98–107)
CHOLEST SERPL-MCNC: 199 MG/DL
CREAT SERPL-MCNC: 0.94 MG/DL (ref 0.67–1.17)
DEPRECATED HCO3 PLAS-SCNC: 26 MMOL/L (ref 22–29)
EGFRCR SERPLBLD CKD-EPI 2021: >90 ML/MIN/1.73M2
GLUCOSE SERPL-MCNC: 87 MG/DL (ref 70–99)
HDLC SERPL-MCNC: 70 MG/DL
LDLC SERPL CALC-MCNC: 116 MG/DL
NONHDLC SERPL-MCNC: 129 MG/DL
POTASSIUM SERPL-SCNC: 3.8 MMOL/L (ref 3.4–5.3)
SODIUM SERPL-SCNC: 142 MMOL/L (ref 135–145)
TRIGL SERPL-MCNC: 65 MG/DL

## 2024-06-17 ENCOUNTER — MYC REFILL (OUTPATIENT)
Dept: PEDIATRICS | Facility: CLINIC | Age: 34
End: 2024-06-17
Payer: COMMERCIAL

## 2024-06-17 DIAGNOSIS — F40.10 SOCIAL PHOBIA: ICD-10-CM

## 2024-06-18 RX ORDER — HYDROXYZINE HYDROCHLORIDE 25 MG/1
25-50 TABLET, FILM COATED ORAL EVERY 4 HOURS PRN
Qty: 30 TABLET | Refills: 2 | Status: SHIPPED | OUTPATIENT
Start: 2024-06-18

## 2024-11-18 SDOH — HEALTH STABILITY: PHYSICAL HEALTH: ON AVERAGE, HOW MANY MINUTES DO YOU ENGAGE IN EXERCISE AT THIS LEVEL?: 40 MIN

## 2024-11-18 SDOH — HEALTH STABILITY: PHYSICAL HEALTH: ON AVERAGE, HOW MANY DAYS PER WEEK DO YOU ENGAGE IN MODERATE TO STRENUOUS EXERCISE (LIKE A BRISK WALK)?: 3 DAYS

## 2024-11-18 ASSESSMENT — SOCIAL DETERMINANTS OF HEALTH (SDOH): HOW OFTEN DO YOU GET TOGETHER WITH FRIENDS OR RELATIVES?: ONCE A WEEK

## 2024-11-20 ENCOUNTER — OFFICE VISIT (OUTPATIENT)
Dept: PEDIATRICS | Facility: CLINIC | Age: 34
End: 2024-11-20
Payer: COMMERCIAL

## 2024-11-20 VITALS
HEIGHT: 75 IN | DIASTOLIC BLOOD PRESSURE: 71 MMHG | OXYGEN SATURATION: 99 % | SYSTOLIC BLOOD PRESSURE: 127 MMHG | RESPIRATION RATE: 16 BRPM | WEIGHT: 197.6 LBS | TEMPERATURE: 98 F | BODY MASS INDEX: 24.57 KG/M2 | HEART RATE: 115 BPM

## 2024-11-20 DIAGNOSIS — F40.10 SOCIAL PHOBIA: ICD-10-CM

## 2024-11-20 DIAGNOSIS — I10 ESSENTIAL HYPERTENSION: ICD-10-CM

## 2024-11-20 DIAGNOSIS — Z23 NEED FOR VACCINATION: ICD-10-CM

## 2024-11-20 DIAGNOSIS — Z00.00 ROUTINE GENERAL MEDICAL EXAMINATION AT A HEALTH CARE FACILITY: Primary | ICD-10-CM

## 2024-11-20 PROCEDURE — 36415 COLL VENOUS BLD VENIPUNCTURE: CPT | Performed by: INTERNAL MEDICINE

## 2024-11-20 RX ORDER — LISINOPRIL AND HYDROCHLOROTHIAZIDE 10; 12.5 MG/1; MG/1
1 TABLET ORAL DAILY
Qty: 90 TABLET | Refills: 4 | Status: SHIPPED | OUTPATIENT
Start: 2024-11-20

## 2024-11-20 RX ORDER — BUSPIRONE HYDROCHLORIDE 10 MG/1
10 TABLET ORAL 2 TIMES DAILY
Qty: 180 TABLET | Refills: 4 | Status: SHIPPED | OUTPATIENT
Start: 2024-11-20

## 2024-11-20 RX ORDER — HYDROXYZINE HYDROCHLORIDE 25 MG/1
25-50 TABLET, FILM COATED ORAL EVERY 4 HOURS PRN
Qty: 30 TABLET | Refills: 2 | Status: SHIPPED | OUTPATIENT
Start: 2024-11-20

## 2024-11-20 ASSESSMENT — PAIN SCALES - GENERAL: PAINLEVEL_OUTOF10: NO PAIN (0)

## 2024-11-20 NOTE — PROGRESS NOTES
Preventive Care Visit  [facility]  Gildardo Engel MD, Internal Medicine - Pediatrics  Nov 20, 2024      Assessment & Plan       ICD-10-CM    1. Routine general medical examination at a health care facility  Z00.00 BASIC METABOLIC PANEL     Lipid panel reflex to direct LDL Fasting     BASIC METABOLIC PANEL     Lipid panel reflex to direct LDL Fasting      2. Essential hypertension  I10 BASIC METABOLIC PANEL     lisinopril-hydrochlorothiazide (ZESTORETIC) 10-12.5 MG tablet     BASIC METABOLIC PANEL      3. Social phobia  F40.10 hydrOXYzine HCl (ATARAX) 25 MG tablet     busPIRone (BUSPAR) 10 MG tablet      4. Need for vaccination  Z23 INFLUENZA VACCINE,SPLIT VIRUS,TRIVALENT,PF(FLUZONE)     COVID-19 12+ (PFIZER)        Here today for his routine physical examination.  Overall he is feeling well.  He has no acute concerns today.  We updated fasting lab work orders as well as vaccines.    Hypertension.  No changes need with his medication.    Anxiety history.  Has been under good control with buspirone and hydroxyzine as needed.    Gildardo Engel MD     Subjective   Jose is a 34 year old, presenting for the following:  Physical        11/20/2024     4:06 PM   Additional Questions   Roomed by [facility]          HPI    Jose is here today for his routine health examination.  Overall he is feeling well.  He does not have any acute concerns today.    Hypertension history. No cardiac sx such as CP, palpitations, PND, orthopnea, LEMON or peripheral edema.   BP Readings from Last 3 Encounters:   11/20/24 127/71   11/01/23 113/70   10/26/22 120/64     Anxiety history.  Managed with buspirone as well as hydroxyzine as needed.  He feels his mood is overall controlled.  He does not feel the need for change with medication today.      Health Care Directive  Patient does not have a Health Care Directive: Discussed advance care planning with patient; however, patient declined at this time.      11/18/2024   [facility]    How would you rate your overall physical health? Excellent   Feel stress (tense, anxious, or unable to sleep) Not at all            11/18/2024   Nutrition   Three or more servings of calcium each day? Yes   Diet: Regular (no restrictions)   How many servings of fruit and vegetables per day? (!) 2-3   How many sweetened beverages each day? 0-1            11/18/2024   Exercise   Days per week of moderate/strenous exercise 3 days   Average minutes spent exercising at this level 40 min            11/18/2024   Social Factors   Frequency of gathering with friends or relatives Once a week   Worry food won't last until get money to buy more No   Food not last or not have enough money for food? No   Do you have housing? (Housing is defined as stable permanent housing and does not include staying ouside in a car, in a tent, in an abandoned building, in an overnight shelter, or couch-surfing.) Yes   Are you worried about losing your housing? No   Lack of transportation? No   Unable to get utilities (heat,electricity)? No            11/18/2024   Dental   Dentist two times every year? Yes            11/18/2024   TB Screening   Were you born outside of the [place]? No            Today's PHQ-2 Score:       11/19/2024     8:35 PM   PHQ-2 ( 1999 Pfizer)   Q1: Little interest or pleasure in doing things 0    Q2: Feeling down, depressed or hopeless 0    PHQ-2 Score 0    Q1: Little interest or pleasure in doing things Not at all   Q2: Feeling down, depressed or hopeless Not at all   PHQ-2 Score 0       Patient-reported           11/18/2024   Substance Use   Alcohol more than 3/day or more than 7/wk No   Do you use any other substances recreationally? No        Social History     Tobacco Use    Smoking status: Never     Passive exposure: Never    Smokeless tobacco: Never    Tobacco comments:     non smoking home   Vaping Use    Vaping status: Never Used   Substance Use Topics    Alcohol use: Yes     Comment: Drink socially    Drug use: No  "          11/18/2024   STI Screening   New sexual partner(s) since last STI/HIV test? No            11/18/2024   Contraception/Family Planning   Questions about contraception or family planning No           Objective    Exam  /71 (BP Location: Right arm, Patient Position: Sitting, Cuff Size: Adult Large)   Pulse 115   Temp 98  F (36.7  C) (Temporal)   Resp 16   Ht 1.913 m (6' 3.32\")   Wt 89.6 kg (197 lb 9.6 oz)   SpO2 99%   BMI 24.49 kg/m     Estimated body mass index is 24.49 kg/m  as calculated from the following:    Height as of this encounter: 1.913 m (6' 3.32\").    Weight as of this encounter: 89.6 kg (197 lb 9.6 oz).    Physical Exam  GENERAL: healthy, alert and no distress  EYES: PERRL, EOMI  HENT: ear canals and TM's normal. No nasal discharge. OP moist.  NECK: no adenopathy  RESP: lungs clear to auscultation - no rales, rhonchi or wheezes  CV: regular rate and rhythm, normal S1 S2, no murmur, no peripheral edema  ABDOMEN: soft, nontender, bowel sounds normal  MS: no gross musculoskeletal defects noted  SKIN: no suspicious lesions or rashes  NEURO: Normal strength and tone  PSYCH: mentation appears normal, affect normal         Signed Electronically by: Gildardo Engel MD    "

## 2024-11-21 LAB
ANION GAP SERPL CALCULATED.3IONS-SCNC: 11 MMOL/L (ref 7–15)
BUN SERPL-MCNC: 9.3 MG/DL (ref 6–20)
CALCIUM SERPL-MCNC: 9.7 MG/DL (ref 8.8–10.4)
CHLORIDE SERPL-SCNC: 103 MMOL/L (ref 98–107)
CHOLEST SERPL-MCNC: 200 MG/DL
CREAT SERPL-MCNC: 0.87 MG/DL (ref 0.67–1.17)
EGFRCR SERPLBLD CKD-EPI 2021: >90 ML/MIN/1.73M2
FASTING STATUS PATIENT QL REPORTED: YES
FASTING STATUS PATIENT QL REPORTED: YES
GLUCOSE SERPL-MCNC: 91 MG/DL (ref 70–99)
HCO3 SERPL-SCNC: 26 MMOL/L (ref 22–29)
HDLC SERPL-MCNC: 64 MG/DL
LDLC SERPL CALC-MCNC: 123 MG/DL
NONHDLC SERPL-MCNC: 136 MG/DL
POTASSIUM SERPL-SCNC: 3.7 MMOL/L (ref 3.4–5.3)
SODIUM SERPL-SCNC: 140 MMOL/L (ref 135–145)
TRIGL SERPL-MCNC: 67 MG/DL